# Patient Record
Sex: FEMALE | Race: WHITE | NOT HISPANIC OR LATINO | Employment: OTHER | URBAN - METROPOLITAN AREA
[De-identification: names, ages, dates, MRNs, and addresses within clinical notes are randomized per-mention and may not be internally consistent; named-entity substitution may affect disease eponyms.]

---

## 2017-01-05 ENCOUNTER — TELEPHONE (OUTPATIENT)
Dept: CARDIOLOGY | Facility: MEDICAL CENTER | Age: 62
End: 2017-01-05

## 2017-01-05 NOTE — TELEPHONE ENCOUNTER
Spoke with patient who states she did not call us.  She does not know who called her.  She has started the Kenolog cream and the rash from tegaderm is getting slowly better.  If not better or gets worse she will return to Urgent Care or ER.  She has a follow up with July ROBERTS on 1/26/17.

## 2017-01-05 NOTE — TELEPHONE ENCOUNTER
----- Message from Vidhya Junior sent at 1/5/2017  9:29 AM PST -----  Regarding: patient is calling about her rash  HERRERA/Lynne      Patient said she was in the hospital for a rash she got after her surgery. She said someone from the office called but she doesn't know the name of the person who called her. She thinks the caller was following up to check on how she was doing and condition of her rash. She can be reached at [phone].

## 2017-01-26 ENCOUNTER — OFFICE VISIT (OUTPATIENT)
Dept: CARDIOLOGY | Facility: MEDICAL CENTER | Age: 62
End: 2017-01-26
Payer: COMMERCIAL

## 2017-01-26 VITALS
HEIGHT: 69 IN | WEIGHT: 165.4 LBS | SYSTOLIC BLOOD PRESSURE: 122 MMHG | HEART RATE: 74 BPM | OXYGEN SATURATION: 98 % | BODY MASS INDEX: 24.5 KG/M2 | DIASTOLIC BLOOD PRESSURE: 78 MMHG

## 2017-01-26 DIAGNOSIS — R06.02 SHORTNESS OF BREATH: ICD-10-CM

## 2017-01-26 DIAGNOSIS — I10 ESSENTIAL HYPERTENSION: ICD-10-CM

## 2017-01-26 DIAGNOSIS — E78.5 DYSLIPIDEMIA: ICD-10-CM

## 2017-01-26 DIAGNOSIS — J45.31 MILD PERSISTENT ASTHMA WITH ACUTE EXACERBATION: ICD-10-CM

## 2017-01-26 PROCEDURE — 99214 OFFICE O/P EST MOD 30 MIN: CPT | Performed by: NURSE PRACTITIONER

## 2017-01-26 RX ORDER — LEVOFLOXACIN 500 MG/1
TABLET, FILM COATED ORAL
Refills: 0 | COMMUNITY
Start: 2017-01-19 | End: 2017-01-26

## 2017-01-26 RX ORDER — MONTELUKAST SODIUM 10 MG/1
10 TABLET ORAL DAILY
COMMUNITY
End: 2017-08-16 | Stop reason: SDUPTHER

## 2017-01-26 RX ORDER — TIOTROPIUM BROMIDE 18 UG/1
18 CAPSULE ORAL; RESPIRATORY (INHALATION) DAILY
COMMUNITY
End: 2017-08-16 | Stop reason: SDUPTHER

## 2017-01-26 ASSESSMENT — ENCOUNTER SYMPTOMS
WEAKNESS: 0
ORTHOPNEA: 0
PND: 0
PALPITATIONS: 0
SHORTNESS OF BREATH: 1
MYALGIAS: 0
ABDOMINAL PAIN: 0
COUGH: 0
DIZZINESS: 0
NERVOUS/ANXIOUS: 1
CLAUDICATION: 0

## 2017-01-26 NOTE — PROGRESS NOTES
Subjective:   Shefali Ryan is a 61 y.o. female who presents today with her , Oren, and follow-up after right heart catheterization which was done for shortness of breath. She was told by another cardiologist that she has diastolic heart failure, in other words heart failure with preserved ejection fraction. She is given this diagnosis due to shortness of breath and fluid retention. She came to see Dr. Lyon for a second opinion. He did a right heart catheter to determine if she truly had diastolic dysfunction.    Right heart catheterization was done on December 29, 2016 and it showed normal pressures. Per Dr. Lyon, she does not have diastolic heart failure. Previously, she had coronary calcium scan which showed a score of zero in all arteries. She has echocardiogram done December 13, 2016 which showed an ejection fraction 70% with grade 1 diastolic dysfunction.    She does have a history of asthma and is on Singulair and inhalers. She continues to complain of some shortness of breath with exertion and a full feeling in her lower sternal area. She also gives me history that she has a hiatal hernia and had an esophageal stricture dilated.    She has returned to exercise on an elliptical  per recommendation by Dr. Lyon.  She has shortness of breath with exertion but is starting out slow to get back into shape.    She is very worried about her health and is concerned that she was told she has diastolic heart failure when in fact it appears she does not. She does however realize that she is sensitive to sodium and will get fluid retention. She is limiting sodium in her diet.     Past Medical History   Diagnosis Date   • History of chickenpox    • H/O mumps    • H/O measles    • Hypertension    • High cholesterol    • Pneumonia 2014     Has had frequently, but none since 2014   • Bronchitis    • Asthma      history of bronchial spasms, but better since juicing   • Chicken pox    • Mumps    • Measles     • Shingles    • MRSA (methicillin resistant Staphylococcus aureus) 2007     after abdominal exploratory lap, in [place]   • Diastolic dysfunction      Past Surgical History   Procedure Laterality Date   • Abdominal exploration     • Laminotomy     • Other abdominal surgery  [id number]     several abdominal,before and after anterior lumbar fusion to remove scar tissue   • Gyn surgery       c section   • Other neurological surg  2006     anterior and posterior lumbar fusion L4-S1, [place]   • Other cardiac surgery        3 coronary angiograms   • Cardiac cath  12/29/16     right heart cath normal     Family History   Problem Relation Age of Onset   • Cancer Mother    • Diabetes Mother    • Heart Disease Mother    • Heart Disease Father 76     CABG   • Diabetes Father    • Diabetes Other    • Heart Disease Other    • Heart Attack Brother 50     MI     History   Smoking status   • Never Smoker    Smokeless tobacco   • Never Used     Allergies   Allergen Reactions   • Iodine Rash     Rash to topical   • Chlorhexidine Rash     Per patient    • Apple      Raw apples   • Codeine Vomiting   • Erythromycin Rash   • Septra [Bactrim Ds] Rash   • Statins [Hmg-Coa-R Inhibitors]      Unable to tolerate   • Sulfamethoxazole W-Trimethoprim Rash     Per patient   • Ultram [Tramadol] Vomiting     Vomiting, extreme     Outpatient Encounter Prescriptions as of 1/26/2017   Medication Sig Dispense Refill   • montelukast (SINGULAIR) 10 MG Tab Take 10 mg by mouth every day.     • tiotropium (SPIRIVA HANDIHALER) 18 MCG Cap Inhale 18 mcg by mouth every day.     • cetirizine (ZYRTEC) 10 MG Tab Take 10 mg by mouth every day.     • fluticasone (FLONASE) 50 MCG/ACT nasal spray Spray 1 Spray in nose 2 times a day.     • nitroglycerin (NITROSTAT) 0.4 MG SL Tab Place 0.4 mg under tongue as needed.     • Epinastine HCl 0.05 % Solution INSTILL 1 DROP INTO BOTH EYES TWICE A DAY **INDEFINITELY**  11   • pantoprazole (PROTONIX) 40 MG Tablet  Delayed Response Take 40 mg by mouth every day.     • Coenzyme Q10 (CO Q 10) 100 MG Cap Take 1 Can by mouth every day.     • valsartan (DIOVAN) 160 MG Tab Take 1 Tab by mouth every evening.     • rosuvastatin (CRESTOR) 5 MG Tab Take 5 mg by mouth. Every other day     • Specialty Vitamins Products (RETAINE VISION PO) 1 Drop by Ophthalmic route 2 Times a Day.     • temazepam (RESTORIL) 30 MG capsule TAKE 1 TABLET BY MOUTH AT BEDTIME AS NEEDED FOR SLEEP (Patient taking differently: every bedtime. TAKE 1 TABLET BY MOUTH AT BEDTIME AS NEEDED FOR SLEEP) 30 Cap 2   • Cholecalciferol (VITAMIN D) 2000 UNIT Tab Take 2,000 Units by mouth every day.     • ipratropium-albuterol (COMBIVENT RESPIMAT)  MCG/ACT Aero Soln Inhale 1 Puff by mouth 4 times a day. (Patient taking differently: Inhale 1 Puff by mouth 4 times a day as needed.) 2 Inhaler 2   • budesonide-formoterol (SYMBICORT) 160-4.5 MCG/ACT Aerosol Inhale 1 Puff by mouth 2 Times a Day. 2 Inhaler 2   • aspirin EC (ECOTRIN) 81 MG TBEC Take 81 mg by mouth every day.     • [DISCONTINUED] levofloxacin (LEVAQUIN) 500 MG tablet TK 1 T PO QD  0   • [DISCONTINUED] cephALEXin (KEFLEX) 500 MG Cap Take 1 Cap by mouth 3 times a day. (Patient not taking: Reported on 1/26/2017) 15 Cap 0   • [DISCONTINUED] ipratropium (ATROVENT) 0.06 % Solution INSTILL 2 SPRAYS INTO THE NOSE 4 TIMES A DAY  3   • [DISCONTINUED] triamcinolone acetonide (KENALOG) 0.1 % Cream Apply 1 Film to affected area(s) 2 times a day. (Patient not taking: Reported on 1/26/2017) 1 Tube 0     No facility-administered encounter medications on file as of 1/26/2017.     Review of Systems   Constitutional: Negative for malaise/fatigue.   Respiratory: Positive for shortness of breath (exertion). Negative for cough.    Cardiovascular: Positive for chest pain (fullness lower substernal area occasionally. Not always associated with exertion.) and leg swelling (when eating large amount of sodium.). Negative for palpitations,  "orthopnea, claudication and PND.   Gastrointestinal: Negative for abdominal pain.   Musculoskeletal: Negative for myalgias.   Neurological: Negative for dizziness and weakness.   Psychiatric/Behavioral: The patient is nervous/anxious.         Objective:   /78 mmHg  Pulse 74  Ht 1.753 m (5' 9.02\")  Wt 75.025 kg (165 lb 6.4 oz)  BMI 24.41 kg/m2  SpO2 98%    Physical Exam   Constitutional: She is oriented to person, place, and time. She appears well-developed and well-nourished.   HENT:   Head: Normocephalic.   Eyes: Conjunctivae are normal.   Neck: No JVD present. No thyromegaly present.   Cardiovascular: Normal rate, regular rhythm, S1 normal, S2 normal and normal heart sounds.  Exam reveals no gallop, no S3, no S4 and no friction rub.    No murmur heard.  Pulmonary/Chest: Effort normal and breath sounds normal. No respiratory distress. She has no wheezes. She has no rales.   Abdominal: Soft. Bowel sounds are normal. She exhibits no distension. There is no tenderness.   Musculoskeletal: She exhibits no edema.   Neurological: She is alert and oriented to person, place, and time.   Skin: Skin is warm and dry.   Psychiatric: She has a normal mood and affect.     December 29, 2016: Right heart catheterization:   CONCLUSIONS:  1.  At this time, there is no conclusive evidence indicating significant    diastolic dysfunction in this patient.  2.  Intracardiac pressures are normal at rest and with exercise.  3.  There is no evidence of left to right shunt based on shunt runs.  4.  The patient will be followed in our clinic in the future for further    Evaluation    Results for RICHARD CHAVEZ (MRN [id number]) as of 1/26/2017 10:44   Ref. Range 12/29/2016 10:00   Sodium Latest Ref Range: 135-145 mmol/L 141   Potassium Latest Ref Range: 3.6-5.5 mmol/L 4.1   Chloride Latest Ref Range:  mmol/L 105   Co2 Latest Ref Range: 20-33 mmol/L 26   Anion Gap Latest Ref Range: 0.0-11.9  10.0   Glucose Latest Ref " Range: 65-99 mg/dL 103 (H)   Bun Latest Ref Range: 8-22 mg/dL 21   Creatinine Latest Ref Range: 0.50-1.40 mg/dL 0.65   GFR If  Latest Ref Range: >60 mL/min/1.73 m 2 >60   GFR If Non  Latest Ref Range: >60 mL/min/1.73 m 2 >60   Calcium Latest Ref Range: 8.5-10.5 mg/dL 9.6       Assessment:     1. Shortness of breath     2. Essential hypertension     3. Dyslipidemia     4. Mild persistent asthma with acute exacerbation         Medical Decision Making:  Today's Assessment / Status / Plan:     Shortness of breath: Probably related to her asthma. Does not appear that she has diastolic heart failure per Dr. Lyon's right heart catheter. I expect that when she exerts her so she will feel short of breath and this may be appropriate breathing for exercise.    Hypertension: Her blood pressure is excellent the office today. Continue current medication regimen.    Dyslipidemia: Followed by either primary care or her cardiologist in [place].    Asthma: Probably the cause of her shortness of breath with exertion. Appears well controlled as she has no wheezes today.    It looks as though she does not have heart failure with preserved ejection fraction as indicated by the right heart catheterization. She does however have asthma which would explain her shortness of breath with exertion. She will follow-up with either Dr. Lyon or Dr. Quispe in [place]. She is welcome to return to our practice at any time.    Collaborating Provider: Dr. Stefani Diego.

## 2017-01-26 NOTE — Clinical Note
[facility]   [address] [place] [address]  [place] [address]  Phone: [phone]  Fax: [phone]              Shefali Ryan  1955    Encounter Date: 1/26/2017    DAMIEN Sparrow          PROGRESS NOTE:  Subjective:   Shefali Ryan is a 61 y.o. female who presents today with her , Oren, and follow-up after right heart catheterization which was done for shortness of breath. She was told by another cardiologist that she has diastolic heart failure, in other words heart failure with preserved ejection fraction. She is given this diagnosis due to shortness of breath and fluid retention. She came to see Dr. Lyon for a second opinion. He did a right heart catheter to determine if she truly had diastolic dysfunction.    Right heart catheterization was done on December 29, 2016 and it showed normal pressures. Per Dr. Lyon, she does not have diastolic heart failure. Previously, she had coronary calcium scan which showed a score of zero in all arteries. She has echocardiogram done December 13, 2016 which showed an ejection fraction 70% with grade 1 diastolic dysfunction.    She does have a history of asthma and is on Singulair and inhalers. She continues to complain of some shortness of breath with exertion and a full feeling in her lower sternal area. She also gives me history that she has a hiatal hernia and had an esophageal stricture dilated.    She has returned to exercise on an elliptical  per recommendation by Dr. Lyon.  She has shortness of breath with exertion but is starting out slow to get back into shape.    She is very worried about her health and is concerned that she was told she has diastolic heart failure when in fact it appears she does not. She does however realize that she is sensitive to sodium and will get fluid retention. She is limiting sodium in her diet.     Past Medical History   Diagnosis Date   • History of  chickenpox    • H/O mumps    • H/O measles    • Hypertension    • High cholesterol    • Pneumonia 2014     Has had frequently, but none since 2014   • Bronchitis    • Asthma      history of bronchial spasms, but better since juicing   • Chicken pox    • Mumps    • Measles    • Shingles    • MRSA (methicillin resistant Staphylococcus aureus) 2007     after abdominal exploratory lap, in [place]   • Diastolic dysfunction      Past Surgical History   Procedure Laterality Date   • Abdominal exploration     • Laminotomy     • Other abdominal surgery  [id number]     several abdominal,before and after anterior lumbar fusion to remove scar tissue   • Gyn surgery       c section   • Other neurological surg  2006     anterior and posterior lumbar fusion L4-S1, [place]   • Other cardiac surgery        3 coronary angiograms   • Cardiac cath  12/29/16     right heart cath normal     Family History   Problem Relation Age of Onset   • Cancer Mother    • Diabetes Mother    • Heart Disease Mother    • Heart Disease Father 76     CABG   • Diabetes Father    • Diabetes Other    • Heart Disease Other    • Heart Attack Brother 50     MI     History   Smoking status   • Never Smoker    Smokeless tobacco   • Never Used     Allergies   Allergen Reactions   • Iodine Rash     Rash to topical   • Chlorhexidine Rash     Per patient    • Apple      Raw apples   • Codeine Vomiting   • Erythromycin Rash   • Septra [Bactrim Ds] Rash   • Statins [Hmg-Coa-R Inhibitors]      Unable to tolerate   • Sulfamethoxazole W-Trimethoprim Rash     Per patient   • Ultram [Tramadol] Vomiting     Vomiting, extreme     Outpatient Encounter Prescriptions as of 1/26/2017   Medication Sig Dispense Refill   • montelukast (SINGULAIR) 10 MG Tab Take 10 mg by mouth every day.     • tiotropium (SPIRIVA HANDIHALER) 18 MCG Cap Inhale 18 mcg by mouth every day.     • cetirizine (ZYRTEC) 10 MG Tab Take 10 mg by mouth every day.     • fluticasone (FLONASE) 50 MCG/ACT nasal  spray Spray 1 Spray in nose 2 times a day.     • nitroglycerin (NITROSTAT) 0.4 MG SL Tab Place 0.4 mg under tongue as needed.     • Epinastine HCl 0.05 % Solution INSTILL 1 DROP INTO BOTH EYES TWICE A DAY **INDEFINITELY**  11   • pantoprazole (PROTONIX) 40 MG Tablet Delayed Response Take 40 mg by mouth every day.     • Coenzyme Q10 (CO Q 10) 100 MG Cap Take 1 Can by mouth every day.     • valsartan (DIOVAN) 160 MG Tab Take 1 Tab by mouth every evening.     • rosuvastatin (CRESTOR) 5 MG Tab Take 5 mg by mouth. Every other day     • Specialty Vitamins Products (RETAINE VISION PO) 1 Drop by Ophthalmic route 2 Times a Day.     • temazepam (RESTORIL) 30 MG capsule TAKE 1 TABLET BY MOUTH AT BEDTIME AS NEEDED FOR SLEEP (Patient taking differently: every bedtime. TAKE 1 TABLET BY MOUTH AT BEDTIME AS NEEDED FOR SLEEP) 30 Cap 2   • Cholecalciferol (VITAMIN D) 2000 UNIT Tab Take 2,000 Units by mouth every day.     • ipratropium-albuterol (COMBIVENT RESPIMAT)  MCG/ACT Aero Soln Inhale 1 Puff by mouth 4 times a day. (Patient taking differently: Inhale 1 Puff by mouth 4 times a day as needed.) 2 Inhaler 2   • budesonide-formoterol (SYMBICORT) 160-4.5 MCG/ACT Aerosol Inhale 1 Puff by mouth 2 Times a Day. 2 Inhaler 2   • aspirin EC (ECOTRIN) 81 MG TBEC Take 81 mg by mouth every day.     • [DISCONTINUED] levofloxacin (LEVAQUIN) 500 MG tablet TK 1 T PO QD  0   • [DISCONTINUED] cephALEXin (KEFLEX) 500 MG Cap Take 1 Cap by mouth 3 times a day. (Patient not taking: Reported on 1/26/2017) 15 Cap 0   • [DISCONTINUED] ipratropium (ATROVENT) 0.06 % Solution INSTILL 2 SPRAYS INTO THE NOSE 4 TIMES A DAY  3   • [DISCONTINUED] triamcinolone acetonide (KENALOG) 0.1 % Cream Apply 1 Film to affected area(s) 2 times a day. (Patient not taking: Reported on 1/26/2017) 1 Tube 0     No facility-administered encounter medications on file as of 1/26/2017.     Review of Systems   Constitutional: Negative for malaise/fatigue.   Respiratory: Positive  "for shortness of breath (exertion). Negative for cough.    Cardiovascular: Positive for chest pain (fullness lower substernal area occasionally. Not always associated with exertion.) and leg swelling (when eating large amount of sodium.). Negative for palpitations, orthopnea, claudication and PND.   Gastrointestinal: Negative for abdominal pain.   Musculoskeletal: Negative for myalgias.   Neurological: Negative for dizziness and weakness.   Psychiatric/Behavioral: The patient is nervous/anxious.         Objective:   /78 mmHg  Pulse 74  Ht 1.753 m (5' 9.02\")  Wt 75.025 kg (165 lb 6.4 oz)  BMI 24.41 kg/m2  SpO2 98%    Physical Exam   Constitutional: She is oriented to person, place, and time. She appears well-developed and well-nourished.   HENT:   Head: Normocephalic.   Eyes: Conjunctivae are normal.   Neck: No JVD present. No thyromegaly present.   Cardiovascular: Normal rate, regular rhythm, S1 normal, S2 normal and normal heart sounds.  Exam reveals no gallop, no S3, no S4 and no friction rub.    No murmur heard.  Pulmonary/Chest: Effort normal and breath sounds normal. No respiratory distress. She has no wheezes. She has no rales.   Abdominal: Soft. Bowel sounds are normal. She exhibits no distension. There is no tenderness.   Musculoskeletal: She exhibits no edema.   Neurological: She is alert and oriented to person, place, and time.   Skin: Skin is warm and dry.   Psychiatric: She has a normal mood and affect.     December 29, 2016: Right heart catheterization:   CONCLUSIONS:  1.  At this time, there is no conclusive evidence indicating significant    diastolic dysfunction in this patient.  2.  Intracardiac pressures are normal at rest and with exercise.  3.  There is no evidence of left to right shunt based on shunt runs.  4.  The patient will be followed in our clinic in the future for further    Evaluation    Results for RICHARD CHAVEZ (MRN [id number]) as of 1/26/2017 10:44   Ref. Range " 12/29/2016 10:00   Sodium Latest Ref Range: 135-145 mmol/L 141   Potassium Latest Ref Range: 3.6-5.5 mmol/L 4.1   Chloride Latest Ref Range:  mmol/L 105   Co2 Latest Ref Range: 20-33 mmol/L 26   Anion Gap Latest Ref Range: 0.0-11.9  10.0   Glucose Latest Ref Range: 65-99 mg/dL 103 (H)   Bun Latest Ref Range: 8-22 mg/dL 21   Creatinine Latest Ref Range: 0.50-1.40 mg/dL 0.65   GFR If  Latest Ref Range: >60 mL/min/1.73 m 2 >60   GFR If Non  Latest Ref Range: >60 mL/min/1.73 m 2 >60   Calcium Latest Ref Range: 8.5-10.5 mg/dL 9.6       Assessment:     1. Shortness of breath     2. Essential hypertension     3. Dyslipidemia     4. Mild persistent asthma with acute exacerbation         Medical Decision Making:  Today's Assessment / Status / Plan:     Shortness of breath: Probably related to her asthma. Does not appear that she has diastolic heart failure per Dr. Lyon's right heart catheter. I expect that when she exerts her so she will feel short of breath and this may be appropriate breathing for exercise.    Hypertension: Her blood pressure is excellent the office today. Continue current medication regimen.    Dyslipidemia: Followed by either primary care or her cardiologist in [place].    Asthma: Probably the cause of her shortness of breath with exertion. Appears well controlled as she has no wheezes today.    It looks as though she does not have heart failure with preserved ejection fraction as indicated by the right heart catheterization. She does however have asthma which would explain her shortness of breath with exertion. She will follow-up with either Dr. Lyon or Dr. Quispe in [place]. She is welcome to return to our practice at any time.    Collaborating Provider: Dr. Stefani Diego.        No Recipients

## 2017-01-26 NOTE — MR AVS SNAPSHOT
"Shefali Hannah Linkover   2017 10:20 AM   Office Visit   MRN: [id number]    Department:  [facility]   Dept Phone:  [phone]    Description:  Female : 1955   Provider:  DAMIEN Sparrow           Reason for Visit     Follow-Up Here for a follow up to go over recent angiogram and patient brought in old angiogram from [facility] on 10/13/2016.      Allergies as of 2017     Allergen Noted Reactions    Iodine 2017   Rash    Rash to topical    Chlorhexidine 12/15/2016   Rash    Per patient     Apple 2014       Raw apples    Codeine 2014   Vomiting    Erythromycin 2014   Rash    Septra [Bactrim Ds] 2014   Rash    Statins [Hmg-Coa-R Inhibitors] 2015       Unable to tolerate    Sulfamethoxazole W-Trimethoprim 12/15/2016   Rash    Per patient    Ultram [Tramadol] 2015   Vomiting    Vomiting, extreme      You were diagnosed with     Shortness of breath   [786.05.ICD-9-CM]       Essential hypertension   [4784448]       Dyslipidemia   [555728]       Mild persistent asthma with acute exacerbation   [384902]         Vital Signs     Blood Pressure Pulse Height Weight Body Mass Index Oxygen Saturation    122/78 mmHg 74 1.753 m (5' 9.02\") 75.025 kg (165 lb 6.4 oz) 24.41 kg/m2 98%    Smoking Status                   Never Smoker            Basic Information     Date Of Birth Sex Race Ethnicity Preferred Language    1955 Female White Non- English      Problem List              ICD-10-CM Priority Class Noted - Resolved    Essential hypertension I10   2014 - Present    Insomnia G47.00   2014 - Present    Dyslipidemia E78.5   2014 - Present    Elevated glucose R73.09   2015 - Present    Dyspnea R06.00   2015 - Present    Baker's cyst M71.20   2016 - Present    Environmental allergies Z91.09   2016 - Present    Mild persistent asthma with acute exacerbation J45.31   2016 - Present      Health Maintenance    "    Date Due Completion Dates    IMM DTaP/Tdap/Td Vaccine (1 - Tdap) 9/25/1975 ---    MAMMOGRAM 9/5/2015 9/5/2014 (Done)    Override on 9/5/2014: Done (patient reports through GYN. I request she provide a record. )    IMM ZOSTER VACCINE 9/25/2016 ---    PAP SMEAR 9/5/2017 9/5/2014 (Done), 9/5/2014 (Done)    Override on 9/5/2014: Done    Override on 9/5/2014: Done (patient reports through GYN. I request she provide a record. )    COLONOSCOPY 1/1/2023 1/1/2013 (N/S), 1/1/2013 (Done)    Override on 1/1/2013: (N/S)    Override on 1/1/2013: Done            Current Immunizations     Influenza Vaccine Quad Inj (Pf) 10/18/2016  4:02 PM, 10/7/2014    Pneumococcal polysaccharide vaccine (PPSV-23) 10/18/2016  4:01 PM      Below and/or attached are the medications your provider expects you to take. Review all of your home medications and newly ordered medications with your provider and/or pharmacist. Follow medication instructions as directed by your provider and/or pharmacist. Please keep your medication list with you and share with your provider. Update the information when medications are discontinued, doses are changed, or new medications (including over-the-counter products) are added; and carry medication information at all times in the event of emergency situations     Allergies:  IODINE - Rash     CHLORHEXIDINE - Rash     APPLE - (reactions not documented)     CODEINE - Vomiting     ERYTHROMYCIN - Rash     SEPTRA - Rash     STATINS - (reactions not documented)     SULFAMETHOXAZOLE W-TRIMETHOPRIM - Rash     ULTRAM - Vomiting               Medications  Valid as of: January 26, 2017 - 11:29 AM    Generic Name Brand Name Tablet Size Instructions for use    Aspirin (Tablet Delayed Response) ECOTRIN 81 MG Take 81 mg by mouth every day.        Budesonide-Formoterol Fumarate (Aerosol) SYMBICORT 160-4.5 MCG/ACT Inhale 1 Puff by mouth 2 Times a Day.        Cetirizine HCl (Tab) ZYRTEC 10 MG Take 10 mg by mouth every day.         Cholecalciferol (Tab) vitamin D 2000 UNIT Take 2,000 Units by mouth every day.        Coenzyme Q10 (Cap) Co Q 10 100 MG Take 1 Can by mouth every day.        Epinastine HCl (Solution) Epinastine HCl 0.05 % INSTILL 1 DROP INTO BOTH EYES TWICE A DAY **INDEFINITELY**        Fluticasone Propionate (Suspension) FLONASE 50 MCG/ACT Spray 1 Spray in nose 2 times a day.        Ipratropium-Albuterol (Aero Soln) COMBIVENT RESPIMAT  MCG/ACT Inhale 1 Puff by mouth 4 times a day.        Montelukast Sodium (Tab) SINGULAIR 10 MG Take 10 mg by mouth every day.        Nitroglycerin (SL Tab) NITROSTAT 0.4 MG Place 0.4 mg under tongue as needed.        Pantoprazole Sodium (Tablet Delayed Response) PROTONIX 40 MG Take 40 mg by mouth every day.        Rosuvastatin Calcium (Tab) CRESTOR 5 MG Take 5 mg by mouth. Every other day        Specialty Vitamins Products   1 Drop by Ophthalmic route 2 Times a Day.        Temazepam (Cap) RESTORIL 30 MG TAKE 1 TABLET BY MOUTH AT BEDTIME AS NEEDED FOR SLEEP        Tiotropium Bromide Monohydrate (Cap) SPIRIVA 18 MCG Inhale 18 mcg by mouth every day.        Valsartan (Tab) DIOVAN 160 MG Take 1 Tab by mouth every evening.        .                 Medicines prescribed today were sent to:     [facility] [address] [place] [address] - [address] [place], [place] [address] [facility] [address]    Phone: [phone] Fax: [phone]    Open 24 Hours?: No      Medication refill instructions:       If your prescription bottle indicates you have medication refills left, it is not necessary to call your provider’s office. Please contact your pharmacy and they will refill your medication.    If your prescription bottle indicates you do not have any refills left, you may request refills at any time through one of the following ways: The online [employer] system (except Urgent Care), by calling your provider’s office, or by asking your pharmacy to contact your provider’s office with a refill request. Medication  refills are processed only during regular business hours and may not be available until the next business day. Your provider may request additional information or to have a follow-up visit with you prior to refilling your medication.   *Please Note: Medication refills are assigned a new Rx number when refilled electronically. Your pharmacy may indicate that no refills were authorized even though a new prescription for the same medication is available at the pharmacy. Please request the medicine by name with the pharmacy before contacting your provider for a refill.           [employer] Access Code: Activation code not generated  Current [employer] Status: Active

## 2017-08-10 PROBLEM — R79.89 LOW VITAMIN D LEVEL: Status: ACTIVE | Noted: 2017-08-10

## 2017-10-05 ENCOUNTER — APPOINTMENT (RX ONLY)
Dept: URBAN - METROPOLITAN AREA CLINIC 31 | Facility: CLINIC | Age: 62
Setting detail: DERMATOLOGY
End: 2017-10-05

## 2017-10-05 DIAGNOSIS — L82.1 OTHER SEBORRHEIC KERATOSIS: ICD-10-CM

## 2017-10-05 DIAGNOSIS — L57.8 OTHER SKIN CHANGES DUE TO CHRONIC EXPOSURE TO NONIONIZING RADIATION: ICD-10-CM

## 2017-10-05 DIAGNOSIS — D18.0 HEMANGIOMA: ICD-10-CM

## 2017-10-05 DIAGNOSIS — D22 MELANOCYTIC NEVI: ICD-10-CM

## 2017-10-05 DIAGNOSIS — L57.0 ACTINIC KERATOSIS: ICD-10-CM

## 2017-10-05 PROBLEM — D18.01 HEMANGIOMA OF SKIN AND SUBCUTANEOUS TISSUE: Status: ACTIVE | Noted: 2017-10-05

## 2017-10-05 PROBLEM — D22.5 MELANOCYTIC NEVI OF TRUNK: Status: ACTIVE | Noted: 2017-10-05

## 2017-10-05 PROBLEM — I10 ESSENTIAL (PRIMARY) HYPERTENSION: Status: ACTIVE | Noted: 2017-10-05

## 2017-10-05 PROCEDURE — ? LIQUID NITROGEN

## 2017-10-05 PROCEDURE — 99214 OFFICE O/P EST MOD 30 MIN: CPT | Mod: 25

## 2017-10-05 PROCEDURE — ? COUNSELING

## 2017-10-05 PROCEDURE — 17003 DESTRUCT PREMALG LES 2-14: CPT

## 2017-10-05 PROCEDURE — 17000 DESTRUCT PREMALG LESION: CPT

## 2017-10-05 ASSESSMENT — LOCATION SIMPLE DESCRIPTION DERM
LOCATION SIMPLE: RIGHT HAND
LOCATION SIMPLE: NOSE
LOCATION SIMPLE: LEFT UPPER BACK
LOCATION SIMPLE: RIGHT CHEEK
LOCATION SIMPLE: RIGHT FOREARM
LOCATION SIMPLE: LEFT ANTERIOR NECK
LOCATION SIMPLE: CHEST
LOCATION SIMPLE: LEFT HAND
LOCATION SIMPLE: RIGHT UPPER BACK
LOCATION SIMPLE: LEFT FOREARM

## 2017-10-05 ASSESSMENT — LOCATION DETAILED DESCRIPTION DERM
LOCATION DETAILED: RIGHT MID-UPPER BACK
LOCATION DETAILED: LEFT MEDIAL INFERIOR CHEST
LOCATION DETAILED: LEFT PROXIMAL DORSAL FOREARM
LOCATION DETAILED: RIGHT DISTAL DORSAL FOREARM
LOCATION DETAILED: RIGHT RADIAL DORSAL HAND
LOCATION DETAILED: RIGHT INFERIOR CENTRAL MALAR CHEEK
LOCATION DETAILED: LEFT ULNAR DORSAL HAND
LOCATION DETAILED: LEFT MEDIAL UPPER BACK
LOCATION DETAILED: NASAL ROOT
LOCATION DETAILED: LEFT SUPERIOR ANTERIOR NECK
LOCATION DETAILED: RIGHT PROXIMAL DORSAL FOREARM

## 2017-10-05 ASSESSMENT — LOCATION ZONE DERM
LOCATION ZONE: HAND
LOCATION ZONE: FACE
LOCATION ZONE: NOSE
LOCATION ZONE: ARM
LOCATION ZONE: TRUNK
LOCATION ZONE: NECK

## 2017-10-05 NOTE — PROCEDURE: LIQUID NITROGEN
Post-Care Instructions: I reviewed with the patient in detail post-care instructions. Patient is to wear sunprotection, and avoid picking at any of the treated lesions. Pt may apply Vaseline to crusted or scabbing areas.
Number Of Freeze-Thaw Cycles: 1 freeze-thaw cycle
Consent: The patient's consent was obtained including but not limited to risks of crusting, scabbing, blistering, scarring, darker or lighter pigmentary change, recurrence, incomplete removal and infection.
Duration Of Freeze Thaw-Cycle (Seconds): 15
Render Post-Care Instructions In Note?: no
Detail Level: Detailed

## 2017-10-05 NOTE — PROCEDURE: REASSURANCE
Additional Note: Patient is reassured regarding the benign nature of this/these lesion(s).  Patient is encouraged to return for evaluation if lesion(s) grow, change, or becomes symptomatic.\\n
Detail Level: Zone
Include Location In Plan?: No

## 2017-11-09 PROBLEM — S06.0X0A CONCUSSION WITH NO LOSS OF CONSCIOUSNESS: Status: ACTIVE | Noted: 2017-11-09

## 2017-11-10 PROBLEM — M54.12 CERVICAL RADICULOPATHY: Status: ACTIVE | Noted: 2017-11-10

## 2018-03-02 ENCOUNTER — APPOINTMENT (RX ONLY)
Dept: URBAN - METROPOLITAN AREA CLINIC 31 | Facility: CLINIC | Age: 63
Setting detail: DERMATOLOGY
End: 2018-03-02

## 2018-03-02 DIAGNOSIS — D22 MELANOCYTIC NEVI: ICD-10-CM

## 2018-03-02 DIAGNOSIS — D18.0 HEMANGIOMA: ICD-10-CM

## 2018-03-02 DIAGNOSIS — L57.0 ACTINIC KERATOSIS: ICD-10-CM

## 2018-03-02 DIAGNOSIS — L57.8 OTHER SKIN CHANGES DUE TO CHRONIC EXPOSURE TO NONIONIZING RADIATION: ICD-10-CM

## 2018-03-02 DIAGNOSIS — L25.9 UNSPECIFIED CONTACT DERMATITIS, UNSPECIFIED CAUSE: ICD-10-CM

## 2018-03-02 DIAGNOSIS — L73.8 OTHER SPECIFIED FOLLICULAR DISORDERS: ICD-10-CM

## 2018-03-02 PROBLEM — D22.39 MELANOCYTIC NEVI OF OTHER PARTS OF FACE: Status: ACTIVE | Noted: 2018-03-02

## 2018-03-02 PROBLEM — D18.01 HEMANGIOMA OF SKIN AND SUBCUTANEOUS TISSUE: Status: ACTIVE | Noted: 2018-03-02

## 2018-03-02 PROBLEM — D22.5 MELANOCYTIC NEVI OF TRUNK: Status: ACTIVE | Noted: 2018-03-02

## 2018-03-02 PROBLEM — D22.61 MELANOCYTIC NEVI OF RIGHT UPPER LIMB, INCLUDING SHOULDER: Status: ACTIVE | Noted: 2018-03-02

## 2018-03-02 PROBLEM — D22.62 MELANOCYTIC NEVI OF LEFT UPPER LIMB, INCLUDING SHOULDER: Status: ACTIVE | Noted: 2018-03-02

## 2018-03-02 PROCEDURE — ? LIQUID NITROGEN

## 2018-03-02 PROCEDURE — ? COUNSELING

## 2018-03-02 PROCEDURE — ? PRESCRIPTION

## 2018-03-02 PROCEDURE — 99213 OFFICE O/P EST LOW 20 MIN: CPT | Mod: 25

## 2018-03-02 PROCEDURE — 17000 DESTRUCT PREMALG LESION: CPT

## 2018-03-02 RX ORDER — HYDROCORTISONE 25 MG/G
CREAM TOPICAL
Qty: 30 | Refills: 3 | Status: ERX

## 2018-03-02 ASSESSMENT — LOCATION SIMPLE DESCRIPTION DERM
LOCATION SIMPLE: INFERIOR FOREHEAD
LOCATION SIMPLE: LEFT ELBOW
LOCATION SIMPLE: RIGHT CHEEK
LOCATION SIMPLE: RIGHT UPPER ARM
LOCATION SIMPLE: CHEST
LOCATION SIMPLE: LEFT FOREARM
LOCATION SIMPLE: NOSE
LOCATION SIMPLE: RIGHT FOREARM
LOCATION SIMPLE: GLUTEAL CLEFT
LOCATION SIMPLE: LEFT HAND
LOCATION SIMPLE: LEFT CHEEK
LOCATION SIMPLE: LEFT UPPER BACK
LOCATION SIMPLE: LEFT ANTERIOR NECK
LOCATION SIMPLE: RIGHT HAND

## 2018-03-02 ASSESSMENT — LOCATION DETAILED DESCRIPTION DERM
LOCATION DETAILED: INFERIOR MID FOREHEAD
LOCATION DETAILED: LEFT SUPERIOR ANTERIOR NECK
LOCATION DETAILED: RIGHT DORSAL MIDDLE METACARPOPHALANGEAL JOINT
LOCATION DETAILED: RIGHT PROXIMAL RADIAL DORSAL FOREARM
LOCATION DETAILED: LEFT ULNAR DORSAL HAND
LOCATION DETAILED: RIGHT MEDIAL INFERIOR CHEST
LOCATION DETAILED: RIGHT ANTERIOR DISTAL UPPER ARM
LOCATION DETAILED: LEFT ELBOW
LOCATION DETAILED: LEFT CENTRAL MALAR CHEEK
LOCATION DETAILED: NASAL DORSUM
LOCATION DETAILED: LEFT SUPERIOR UPPER BACK
LOCATION DETAILED: RIGHT INFERIOR CENTRAL MALAR CHEEK
LOCATION DETAILED: LEFT VENTRAL PROXIMAL FOREARM
LOCATION DETAILED: GLUTEAL CLEFT
LOCATION DETAILED: RIGHT MEDIAL SUPERIOR CHEST

## 2018-03-02 ASSESSMENT — LOCATION ZONE DERM
LOCATION ZONE: TRUNK
LOCATION ZONE: ARM
LOCATION ZONE: FACE
LOCATION ZONE: NOSE
LOCATION ZONE: HAND
LOCATION ZONE: NECK

## 2018-03-02 NOTE — PROCEDURE: LIQUID NITROGEN
Duration Of Freeze Thaw-Cycle (Seconds): 15
Consent: The patient's consent was obtained including but not limited to risks of crusting, scabbing, blistering, scarring, darker or lighter pigmentary change, recurrence, incomplete removal and infection.
Render Post-Care Instructions In Note?: no
Post-Care Instructions: I reviewed with the patient in detail post-care instructions. Patient is to wear sunprotection, and avoid picking at any of the treated lesions. Pt may apply Vaseline to crusted or scabbing areas.
Number Of Freeze-Thaw Cycles: 1 freeze-thaw cycle
Detail Level: Detailed

## 2018-05-09 PROBLEM — Z98.1 STATUS POST CERVICAL SPINAL FUSION: Status: ACTIVE | Noted: 2018-05-09

## 2018-12-12 PROBLEM — J45.30 MILD PERSISTENT ASTHMA: Status: ACTIVE | Noted: 2018-12-12

## 2018-12-12 PROBLEM — S06.0X0A CONCUSSION WITH NO LOSS OF CONSCIOUSNESS: Status: RESOLVED | Noted: 2017-11-09 | Resolved: 2018-12-12

## 2019-01-23 ENCOUNTER — APPOINTMENT (RX ONLY)
Dept: URBAN - METROPOLITAN AREA CLINIC 31 | Facility: CLINIC | Age: 64
Setting detail: DERMATOLOGY
End: 2019-01-23

## 2019-01-23 DIAGNOSIS — L57.0 ACTINIC KERATOSIS: ICD-10-CM

## 2019-01-23 DIAGNOSIS — L82.1 OTHER SEBORRHEIC KERATOSIS: ICD-10-CM

## 2019-01-23 DIAGNOSIS — D22 MELANOCYTIC NEVI: ICD-10-CM

## 2019-01-23 DIAGNOSIS — L57.8 OTHER SKIN CHANGES DUE TO CHRONIC EXPOSURE TO NONIONIZING RADIATION: ICD-10-CM

## 2019-01-23 DIAGNOSIS — D18.0 HEMANGIOMA: ICD-10-CM

## 2019-01-23 DIAGNOSIS — L82.0 INFLAMED SEBORRHEIC KERATOSIS: ICD-10-CM

## 2019-01-23 PROBLEM — D22.4 MELANOCYTIC NEVI OF SCALP AND NECK: Status: ACTIVE | Noted: 2019-01-23

## 2019-01-23 PROBLEM — D48.5 NEOPLASM OF UNCERTAIN BEHAVIOR OF SKIN: Status: ACTIVE | Noted: 2019-01-23

## 2019-01-23 PROBLEM — D22.62 MELANOCYTIC NEVI OF LEFT UPPER LIMB, INCLUDING SHOULDER: Status: ACTIVE | Noted: 2019-01-23

## 2019-01-23 PROBLEM — D18.01 HEMANGIOMA OF SKIN AND SUBCUTANEOUS TISSUE: Status: ACTIVE | Noted: 2019-01-23

## 2019-01-23 PROBLEM — D22.71 MELANOCYTIC NEVI OF RIGHT LOWER LIMB, INCLUDING HIP: Status: ACTIVE | Noted: 2019-01-23

## 2019-01-23 PROBLEM — D22.72 MELANOCYTIC NEVI OF LEFT LOWER LIMB, INCLUDING HIP: Status: ACTIVE | Noted: 2019-01-23

## 2019-01-23 PROBLEM — D22.61 MELANOCYTIC NEVI OF RIGHT UPPER LIMB, INCLUDING SHOULDER: Status: ACTIVE | Noted: 2019-01-23

## 2019-01-23 PROBLEM — D22.5 MELANOCYTIC NEVI OF TRUNK: Status: ACTIVE | Noted: 2019-01-23

## 2019-01-23 PROCEDURE — ? LIQUID NITROGEN

## 2019-01-23 PROCEDURE — 99214 OFFICE O/P EST MOD 30 MIN: CPT | Mod: 25

## 2019-01-23 PROCEDURE — ? COUNSELING

## 2019-01-23 PROCEDURE — ? BIOPSY BY SHAVE METHOD

## 2019-01-23 PROCEDURE — ? BENIGN DESTRUCTION

## 2019-01-23 PROCEDURE — 17000 DESTRUCT PREMALG LESION: CPT | Mod: 59

## 2019-01-23 PROCEDURE — 11102 TANGNTL BX SKIN SINGLE LES: CPT | Mod: 59

## 2019-01-23 PROCEDURE — 17110 DESTRUCTION B9 LES UP TO 14: CPT

## 2019-01-23 ASSESSMENT — LOCATION DETAILED DESCRIPTION DERM
LOCATION DETAILED: NASAL DORSUM
LOCATION DETAILED: EPIGASTRIC SKIN
LOCATION DETAILED: RIGHT SUPERIOR UPPER BACK
LOCATION DETAILED: RIGHT SUPERIOR MEDIAL MALAR CHEEK
LOCATION DETAILED: LEFT SUPERIOR CENTRAL MALAR CHEEK
LOCATION DETAILED: RIGHT MID-UPPER BACK
LOCATION DETAILED: LEFT SUPERIOR MEDIAL MIDBACK
LOCATION DETAILED: RIGHT DISTAL CALF
LOCATION DETAILED: RIGHT PROXIMAL DORSAL FOREARM
LOCATION DETAILED: RIGHT ANTERIOR PROXIMAL THIGH
LOCATION DETAILED: LEFT MEDIAL UPPER BACK
LOCATION DETAILED: RIGHT INFERIOR LATERAL MIDBACK
LOCATION DETAILED: RIGHT DISTAL PRETIBIAL REGION
LOCATION DETAILED: RIGHT MEDIAL INFERIOR CHEST
LOCATION DETAILED: RIGHT PROXIMAL PRETIBIAL REGION
LOCATION DETAILED: LEFT PROXIMAL DORSAL FOREARM
LOCATION DETAILED: LEFT DISTAL PRETIBIAL REGION
LOCATION DETAILED: LEFT DISTAL CALF
LOCATION DETAILED: RIGHT DISTAL POSTERIOR UPPER ARM
LOCATION DETAILED: RIGHT INFERIOR CENTRAL MALAR CHEEK
LOCATION DETAILED: LEFT DISTAL POSTERIOR UPPER ARM
LOCATION DETAILED: LEFT PROXIMAL PRETIBIAL REGION
LOCATION DETAILED: LEFT INFERIOR UPPER BACK
LOCATION DETAILED: LEFT PROXIMAL POSTERIOR UPPER ARM
LOCATION DETAILED: RIGHT RADIAL DORSAL HAND
LOCATION DETAILED: LEFT MID-UPPER BACK
LOCATION DETAILED: RIGHT INFERIOR POSTERIOR NECK
LOCATION DETAILED: LEFT ULNAR DORSAL HAND
LOCATION DETAILED: RIGHT PROXIMAL LATERAL POSTERIOR UPPER ARM
LOCATION DETAILED: LEFT SUPERIOR UPPER BACK
LOCATION DETAILED: RIGHT RIB CAGE
LOCATION DETAILED: LEFT LATERAL ABDOMEN
LOCATION DETAILED: LEFT ANTERIOR PROXIMAL THIGH
LOCATION DETAILED: LEFT SUPERIOR ANTERIOR NECK

## 2019-01-23 ASSESSMENT — LOCATION SIMPLE DESCRIPTION DERM
LOCATION SIMPLE: LEFT UPPER BACK
LOCATION SIMPLE: RIGHT FOREARM
LOCATION SIMPLE: LEFT POSTERIOR UPPER ARM
LOCATION SIMPLE: RIGHT CHEEK
LOCATION SIMPLE: RIGHT CALF
LOCATION SIMPLE: LEFT ANTERIOR NECK
LOCATION SIMPLE: NOSE
LOCATION SIMPLE: CHEST
LOCATION SIMPLE: LEFT LOWER BACK
LOCATION SIMPLE: ABDOMEN
LOCATION SIMPLE: RIGHT HAND
LOCATION SIMPLE: RIGHT UPPER BACK
LOCATION SIMPLE: LEFT PRETIBIAL REGION
LOCATION SIMPLE: RIGHT PRETIBIAL REGION
LOCATION SIMPLE: LEFT CHEEK
LOCATION SIMPLE: LEFT HAND
LOCATION SIMPLE: LEFT THIGH
LOCATION SIMPLE: POSTERIOR NECK
LOCATION SIMPLE: LEFT FOREARM
LOCATION SIMPLE: RIGHT LOWER BACK
LOCATION SIMPLE: RIGHT POSTERIOR UPPER ARM
LOCATION SIMPLE: RIGHT THIGH
LOCATION SIMPLE: LEFT CALF

## 2019-01-23 ASSESSMENT — LOCATION ZONE DERM
LOCATION ZONE: FACE
LOCATION ZONE: ARM
LOCATION ZONE: LEG
LOCATION ZONE: NECK
LOCATION ZONE: HAND
LOCATION ZONE: TRUNK
LOCATION ZONE: NOSE

## 2019-01-23 NOTE — PROCEDURE: LIQUID NITROGEN
Duration Of Freeze Thaw-Cycle (Seconds): 15
Post-Care Instructions: I reviewed with the patient in detail post-care instructions. Patient is to wear sunprotection, and avoid picking at any of the treated lesions. Pt may apply Vaseline to crusted or scabbing areas.
Number Of Freeze-Thaw Cycles: 1 freeze-thaw cycle
Render Post-Care Instructions In Note?: no
Detail Level: Detailed
Consent: The patient's consent was obtained including but not limited to risks of crusting, scabbing, blistering, scarring, darker or lighter pigmentary change, recurrence, incomplete removal and infection.

## 2019-01-23 NOTE — PROCEDURE: BIOPSY BY SHAVE METHOD
Wound Care: Petrolatum
Render Post-Care Instructions In Note?: no
Additional Anesthesia Volume In Cc (Will Not Render If 0): 0
Detail Level: Detailed
Lab Facility: 
Electrodesiccation Text: The wound bed was treated with electrodesiccation after the biopsy was performed.
Billing Type: Third-Party Bill
Size Of Lesion In Cm: 0.5
Type Of Destruction Used: Curettage
Depth Of Biopsy: dermis
Electrodesiccation And Curettage Text: The wound bed was treated with electrodesiccation and curettage after the biopsy was performed.
Dressing: bandage
Post-Care Instructions: I reviewed with the patient in detail post-care instructions. Patient is to keep the biopsy site bandaged overnight, and then wash once daily with soap and water and then apply a thin layer of petrolatum once daily until healed.
Biopsy Type: H and E
Notification Instructions: Patient will be notified of biopsy results. However, patient instructed to call the office if not contacted within 2 weeks.
Curettage Text: The wound bed was treated with curettage after the biopsy was performed.
Hemostasis: Aluminum Chloride and Electrocautery
Silver Nitrate Text: The wound bed was treated with silver nitrate after the biopsy was performed.
Lab: [id number]
Was A Bandage Applied: Yes
Anesthesia Type: 1% lidocaine with epinephrine
Consent: Written consent was obtained and risks were reviewed including but not limited to scarring, infection, bleeding, scabbing, incomplete removal, nerve damage and allergy to anesthesia.
Cryotherapy Text: The wound bed was treated with cryotherapy after the biopsy was performed.

## 2019-01-23 NOTE — PROCEDURE: BENIGN DESTRUCTION
Medical Necessity Clause: This procedure was medically necessary because the lesions that were treated were:
Consent: The patient's consent was obtained including but not limited to risks of crusting, scabbing, blistering, scarring, darker or lighter pigmentary change, recurrence, incomplete removal and infection.
Include Z78.9 (Other Specified Conditions Influencing Health Status) As An Associated Diagnosis?: No
Post-Care Instructions: I reviewed with the patient in detail post-care instructions. Patient is to wear sunprotection, and avoid picking at any of the treated lesions. Pt may apply Vaseline to crusted or scabbing areas.
Anesthesia Volume In Cc: 0.5
Treatment Number (Will Not Render If 0): 0
Medical Necessity Information: It is in your best interest to select a reason for this procedure from the list below. All of these items fulfill various CMS LCD requirements except the new and changing color options.
Detail Level: Detailed
Render Post-Care Instructions In Note?: yes

## 2019-06-17 PROBLEM — E78.2 MIXED HYPERLIPIDEMIA: Status: ACTIVE | Noted: 2019-06-17

## 2019-08-29 ENCOUNTER — APPOINTMENT (RX ONLY)
Dept: URBAN - METROPOLITAN AREA CLINIC 31 | Facility: CLINIC | Age: 64
Setting detail: DERMATOLOGY
End: 2019-08-29

## 2019-08-29 DIAGNOSIS — L57.8 OTHER SKIN CHANGES DUE TO CHRONIC EXPOSURE TO NONIONIZING RADIATION: ICD-10-CM

## 2019-08-29 DIAGNOSIS — L82.0 INFLAMED SEBORRHEIC KERATOSIS: ICD-10-CM

## 2019-08-29 DIAGNOSIS — D22 MELANOCYTIC NEVI: ICD-10-CM

## 2019-08-29 DIAGNOSIS — L82.1 OTHER SEBORRHEIC KERATOSIS: ICD-10-CM

## 2019-08-29 PROBLEM — D22.5 MELANOCYTIC NEVI OF TRUNK: Status: ACTIVE | Noted: 2019-08-29

## 2019-08-29 PROBLEM — D22.71 MELANOCYTIC NEVI OF RIGHT LOWER LIMB, INCLUDING HIP: Status: ACTIVE | Noted: 2019-08-29

## 2019-08-29 PROBLEM — D22.72 MELANOCYTIC NEVI OF LEFT LOWER LIMB, INCLUDING HIP: Status: ACTIVE | Noted: 2019-08-29

## 2019-08-29 PROBLEM — D22.61 MELANOCYTIC NEVI OF RIGHT UPPER LIMB, INCLUDING SHOULDER: Status: ACTIVE | Noted: 2019-08-29

## 2019-08-29 PROBLEM — D22.62 MELANOCYTIC NEVI OF LEFT UPPER LIMB, INCLUDING SHOULDER: Status: ACTIVE | Noted: 2019-08-29

## 2019-08-29 PROCEDURE — 99214 OFFICE O/P EST MOD 30 MIN: CPT | Mod: 25

## 2019-08-29 PROCEDURE — 17110 DESTRUCTION B9 LES UP TO 14: CPT

## 2019-08-29 PROCEDURE — ? COUNSELING

## 2019-08-29 PROCEDURE — ? BENIGN DESTRUCTION

## 2019-08-29 ASSESSMENT — LOCATION SIMPLE DESCRIPTION DERM
LOCATION SIMPLE: CHEST
LOCATION SIMPLE: UPPER BACK
LOCATION SIMPLE: RIGHT CHEEK
LOCATION SIMPLE: LEFT ANTERIOR NECK
LOCATION SIMPLE: RIGHT POSTERIOR UPPER ARM
LOCATION SIMPLE: RIGHT LOWER BACK
LOCATION SIMPLE: RIGHT HAND
LOCATION SIMPLE: ABDOMEN
LOCATION SIMPLE: LEFT UPPER ARM
LOCATION SIMPLE: LEFT SHOULDER
LOCATION SIMPLE: RIGHT THIGH
LOCATION SIMPLE: LEFT HAND
LOCATION SIMPLE: RIGHT UPPER BACK
LOCATION SIMPLE: RIGHT BACK
LOCATION SIMPLE: RIGHT FOREARM
LOCATION SIMPLE: LEFT FOREARM
LOCATION SIMPLE: RIGHT SHOULDER
LOCATION SIMPLE: LEFT LOWER BACK
LOCATION SIMPLE: RIGHT ELBOW
LOCATION SIMPLE: RIGHT UPPER ARM
LOCATION SIMPLE: LEFT POSTERIOR UPPER ARM
LOCATION SIMPLE: LEFT UPPER BACK
LOCATION SIMPLE: LEFT PRETIBIAL REGION

## 2019-08-29 ASSESSMENT — LOCATION DETAILED DESCRIPTION DERM
LOCATION DETAILED: PERIUMBILICAL SKIN
LOCATION DETAILED: LEFT INFERIOR UPPER BACK
LOCATION DETAILED: LEFT POSTERIOR SHOULDER
LOCATION DETAILED: RIGHT DISTAL POSTERIOR UPPER ARM
LOCATION DETAILED: RIGHT LATERAL ABDOMEN
LOCATION DETAILED: RIGHT RADIAL DORSAL HAND
LOCATION DETAILED: LEFT PROXIMAL POSTERIOR UPPER ARM
LOCATION DETAILED: RIGHT MEDIAL INFERIOR CHEST
LOCATION DETAILED: LEFT INFERIOR MEDIAL MIDBACK
LOCATION DETAILED: EPIGASTRIC SKIN
LOCATION DETAILED: LEFT SUPERIOR MEDIAL UPPER BACK
LOCATION DETAILED: LEFT ANTECUBITAL SKIN
LOCATION DETAILED: RIGHT POSTERIOR SHOULDER
LOCATION DETAILED: RIGHT SUPERIOR UPPER BACK
LOCATION DETAILED: LEFT PROXIMAL DORSAL FOREARM
LOCATION DETAILED: RIGHT SUPERIOR LATERAL UPPER BACK
LOCATION DETAILED: INFERIOR THORACIC SPINE
LOCATION DETAILED: SUBXIPHOID
LOCATION DETAILED: RIGHT PROXIMAL DORSAL FOREARM
LOCATION DETAILED: LEFT PROXIMAL PRETIBIAL REGION
LOCATION DETAILED: LEFT SUPERIOR MEDIAL MIDBACK
LOCATION DETAILED: RIGHT INFERIOR MEDIAL MIDBACK
LOCATION DETAILED: LEFT SUPERIOR ANTERIOR NECK
LOCATION DETAILED: RIGHT INFERIOR CENTRAL MALAR CHEEK
LOCATION DETAILED: RIGHT SUPERIOR MEDIAL UPPER BACK
LOCATION DETAILED: RIGHT ELBOW
LOCATION DETAILED: LEFT ANTERIOR LATERAL PROXIMAL UPPER ARM
LOCATION DETAILED: LEFT LATERAL SUPERIOR CHEST
LOCATION DETAILED: RIGHT ANTERIOR SHOULDER
LOCATION DETAILED: LEFT MEDIAL SUPERIOR CHEST
LOCATION DETAILED: RIGHT LATERAL SUPERIOR CHEST
LOCATION DETAILED: RIGHT ANTERIOR DISTAL THIGH
LOCATION DETAILED: LEFT LATERAL ABDOMEN
LOCATION DETAILED: RIGHT RIB CAGE
LOCATION DETAILED: MIDDLE STERNUM
LOCATION DETAILED: LEFT ANTERIOR DISTAL UPPER ARM
LOCATION DETAILED: LEFT DISTAL POSTERIOR UPPER ARM
LOCATION DETAILED: RIGHT ANTERIOR DISTAL UPPER ARM
LOCATION DETAILED: LEFT SUPERIOR UPPER BACK
LOCATION DETAILED: RIGHT ANTECUBITAL SKIN
LOCATION DETAILED: LEFT ULNAR DORSAL HAND
LOCATION DETAILED: LEFT ANTERIOR SHOULDER
LOCATION DETAILED: RIGHT MID-UPPER BACK

## 2019-08-29 ASSESSMENT — LOCATION ZONE DERM
LOCATION ZONE: LEG
LOCATION ZONE: HAND
LOCATION ZONE: FACE
LOCATION ZONE: TRUNK
LOCATION ZONE: NECK
LOCATION ZONE: ARM

## 2019-08-29 NOTE — PROCEDURE: BENIGN DESTRUCTION
Medical Necessity Clause: This procedure was medically necessary because the lesions that were treated were:
Render Post-Care Instructions In Note?: yes
Post-Care Instructions: I reviewed with the patient in detail post-care instructions. Patient is to wear sunprotection, and avoid picking at any of the treated lesions. Pt may apply Vaseline to crusted or scabbing areas.
Treatment Number (Will Not Render If 0): 0
Anesthesia Volume In Cc: 0.5
Add 52 Modifier (Optional): no
Medical Necessity Information: It is in your best interest to select a reason for this procedure from the list below. All of these items fulfill various CMS LCD requirements except the new and changing color options.
Detail Level: Detailed
Consent: The patient's consent was obtained including but not limited to risks of crusting, scabbing, blistering, scarring, darker or lighter pigmentary change, recurrence, incomplete removal and infection.

## 2020-10-07 ENCOUNTER — APPOINTMENT (RX ONLY)
Dept: URBAN - METROPOLITAN AREA CLINIC 31 | Facility: CLINIC | Age: 65
Setting detail: DERMATOLOGY
End: 2020-10-07

## 2020-10-07 DIAGNOSIS — D17 BENIGN LIPOMATOUS NEOPLASM: ICD-10-CM

## 2020-10-07 DIAGNOSIS — L82.1 OTHER SEBORRHEIC KERATOSIS: ICD-10-CM

## 2020-10-07 DIAGNOSIS — D18.0 HEMANGIOMA: ICD-10-CM

## 2020-10-07 DIAGNOSIS — L57.0 ACTINIC KERATOSIS: ICD-10-CM

## 2020-10-07 DIAGNOSIS — D22 MELANOCYTIC NEVI: ICD-10-CM

## 2020-10-07 DIAGNOSIS — L81.4 OTHER MELANIN HYPERPIGMENTATION: ICD-10-CM

## 2020-10-07 PROBLEM — D18.01 HEMANGIOMA OF SKIN AND SUBCUTANEOUS TISSUE: Status: ACTIVE | Noted: 2020-10-07

## 2020-10-07 PROBLEM — D17.23 BENIGN LIPOMATOUS NEOPLASM OF SKIN AND SUBCUTANEOUS TISSUE OF RIGHT LEG: Status: ACTIVE | Noted: 2020-10-07

## 2020-10-07 PROBLEM — D22.5 MELANOCYTIC NEVI OF TRUNK: Status: ACTIVE | Noted: 2020-10-07

## 2020-10-07 PROCEDURE — ? LIQUID NITROGEN

## 2020-10-07 PROCEDURE — ? COUNSELING

## 2020-10-07 PROCEDURE — 17000 DESTRUCT PREMALG LESION: CPT

## 2020-10-07 PROCEDURE — 17003 DESTRUCT PREMALG LES 2-14: CPT

## 2020-10-07 PROCEDURE — 99214 OFFICE O/P EST MOD 30 MIN: CPT | Mod: 25

## 2020-10-07 ASSESSMENT — LOCATION SIMPLE DESCRIPTION DERM
LOCATION SIMPLE: LEFT THIGH
LOCATION SIMPLE: RIGHT POSTERIOR UPPER ARM
LOCATION SIMPLE: RIGHT UPPER BACK
LOCATION SIMPLE: RIGHT THIGH
LOCATION SIMPLE: LEFT FOREARM

## 2020-10-07 ASSESSMENT — LOCATION ZONE DERM
LOCATION ZONE: LEG
LOCATION ZONE: ARM
LOCATION ZONE: TRUNK

## 2020-10-07 ASSESSMENT — LOCATION DETAILED DESCRIPTION DERM
LOCATION DETAILED: RIGHT PROXIMAL POSTERIOR UPPER ARM
LOCATION DETAILED: RIGHT INFERIOR UPPER BACK
LOCATION DETAILED: RIGHT MID-UPPER BACK
LOCATION DETAILED: LEFT PROXIMAL DORSAL FOREARM
LOCATION DETAILED: RIGHT ANTERIOR DISTAL THIGH
LOCATION DETAILED: LEFT ANTERIOR MEDIAL DISTAL THIGH

## 2021-02-04 ENCOUNTER — OFFICE VISIT (OUTPATIENT)
Dept: NEUROLOGY | Facility: MEDICAL CENTER | Age: 66
End: 2021-02-04
Attending: PSYCHIATRY & NEUROLOGY
Payer: COMMERCIAL

## 2021-02-04 VITALS
WEIGHT: 132.94 LBS | TEMPERATURE: 97.5 F | OXYGEN SATURATION: 99 % | HEART RATE: 75 BPM | BODY MASS INDEX: 18.61 KG/M2 | DIASTOLIC BLOOD PRESSURE: 82 MMHG | SYSTOLIC BLOOD PRESSURE: 124 MMHG | RESPIRATION RATE: 14 BRPM | HEIGHT: 71 IN

## 2021-02-04 DIAGNOSIS — G24.9 DYSTONIA, UNSPECIFIED: ICD-10-CM

## 2021-02-04 PROCEDURE — 99204 OFFICE O/P NEW MOD 45 MIN: CPT | Performed by: PSYCHIATRY & NEUROLOGY

## 2021-02-04 PROCEDURE — 99202 OFFICE O/P NEW SF 15 MIN: CPT | Performed by: PSYCHIATRY & NEUROLOGY

## 2021-02-04 ASSESSMENT — FIBROSIS 4 INDEX: FIB4 SCORE: 0.59

## 2021-02-04 NOTE — PATIENT INSTRUCTIONS
I have prescribed a medication called carbidopa/levodopa for your muscle clenching. Take 1 tablet of this 3x/day until the next follow-up.     Please record a video of the abnormal movements prior to the next visit.

## 2021-02-04 NOTE — PROGRESS NOTES
Chief Complaint   Patient presents with   • New Patient     Dystonia       History of present illness:  Shefali Mazaover 65 y.o. female with recurrent muscle cramps in her arms/legs that are painful and debilitating.  It started with cramping of her hands 7 years ago that is painful but was intermittent and infrequent and might be in remission for weeks. It started with a single hand and now this has progressed to both arms and both legs. It begins with sharp pain and develops to clenching of her hands and feet. Where her hands are stuck in a flexed position. She has to push down on her legs to force them to relax. Her calf becomes rigid during these episodes. If this is severe, she is unable to use her phone. This is occurring daily now and affects different parts of her body. This is most often at night. This may wake her up 3-4 times at night.   There is no family history of similar symptoms.     Past medical history:   Past Medical History:   Diagnosis Date   • Adverse effect of anesthesia     pt has had cevical neck surgery and canot bend neck back at all   • Asthma     history of bronchial spasms, but better since juicing   • Bronchitis    • Chicken pox    • Diastolic dysfunction    • H/O measles    • H/O mumps    • High cholesterol    • History of chickenpox    • Hypertension    • Measles    • MRSA (methicillin resistant Staphylococcus aureus) 2007    after abdominal exploratory lap, in [place]   • Mumps    • Pneumonia 2014    Has had frequently, but none since 2014   • Shingles        Past surgical history:   Past Surgical History:   Procedure Laterality Date   • GASTROSCOPY N/A 7/16/2019    Procedure: GASTROSCOPY;  Surgeon: Joao Solis M.D.;  Location: SURGERY [facility];  Service: Gen Robotic   • ZZZ CARDIAC CATH  12/29/16    right heart cath normal   • OTHER NEUROLOGICAL SURG  2006    anterior and posterior lumbar fusion L4-S1, [place]   • ABDOMINAL EXPLORATION     • CERVICAL FUSION  POSTERIOR      problems moving neck back   • GYN SURGERY      c section   • LAMINOTOMY     • OTHER ABDOMINAL SURGERY  [id number]    several abdominal,before and after anterior lumbar fusion to remove scar tissue   • OTHER CARDIAC SURGERY       3 coronary angiograms       Family history:   Family History   Problem Relation Age of Onset   • Cancer Mother    • Diabetes Mother    • Heart Disease Mother    • Heart Disease Father 76        CABG   • Diabetes Father    • Heart Attack Brother 50        MI   • Diabetes Other    • Heart Disease Other        Social history:   Social History     Socioeconomic History   • Marital status:      Spouse name: Not on file   • Number of children: Not on file   • Years of education: Not on file   • Highest education level: Not on file   Occupational History   • Not on file   Social Needs   • Financial resource strain: Not on file   • Food insecurity     Worry: Not on file     Inability: Not on file   • Transportation needs     Medical: Not on file     Non-medical: Not on file   Tobacco Use   • Smoking status: Never Smoker   • Smokeless tobacco: Never Used   Substance and Sexual Activity   • Alcohol use: Yes     Alcohol/week: 0.6 oz     Types: 1 Glasses of wine per week   • Drug use: No   • Sexual activity: Not on file   Lifestyle   • Physical activity     Days per week: Not on file     Minutes per session: Not on file   • Stress: Not on file   Relationships   • Social connections     Talks on phone: Not on file     Gets together: Not on file     Attends Christian service: Not on file     Active member of club or organization: Not on file     Attends meetings of clubs or organizations: Not on file     Relationship status: Not on file   • Intimate partner violence     Fear of current or ex partner: Not on file     Emotionally abused: Not on file     Physically abused: Not on file     Forced sexual activity: Not on file   Other Topics Concern   • Not on file   Social History  "Narrative   • Not on file       Current medications:   Current Outpatient Medications   Medication   • carbidopa-levodopa (SINEMET)  MG Tab   • loratadine (CLARITIN) 10 MG Tab   • levothyroxine (SYNTHROID) 25 MCG Tab   • carisoprodol (SOMA) 350 MG Tab   • albuterol 108 (90 Base) MCG/ACT Aero Soln inhalation aerosol   • Azelastine-Fluticasone 137-50 MCG/ACT Suspension   • amLODIPine (NORVASC) 5 MG Tab   • montelukast (SINGULAIR) 10 MG Tab   • omeprazole (PRILOSEC) 20 MG delayed-release capsule   • ciclopirox (PENLAC) 8 % solution   • hydroCHLOROthiazide (HYDRODIURIL) 25 MG Tab   • SPIRIVA RESPIMAT 1.25 MCG/ACT Aero Soln   • potassium chloride SA (KDUR) 20 MEQ Tab CR   • spironolactone (ALDACTONE) 25 MG Tab   • Cholecalciferol (VITAMIN D) 2000 UNIT Tab   • predniSONE (DELTASONE) 20 MG Tab   • Fluticasone Furoate-Vilanterol (BREO) 200-25 MCG/INH AEROSOL POWDER, BREATH ACTIVATED   • Aspirin Buf,CaCarb-MgCarb-MgO, 81 MG Tab   • REPATHA 140 MG/ML Solution Prefilled Syringe   • Coenzyme Q10 (CO Q 10) 100 MG Cap     No current facility-administered medications for this visit.        Medication Allergy:  Allergies   Allergen Reactions   • Iodine Rash     Rash to topical   • Chlorhexidine Rash     Per patient    • Apple      Raw apples   • Codeine Vomiting   • Erythromycin Rash   • Nitrofurantoin Nausea     abdo pain, nausea   • Statins [Hmg-Coa-R Inhibitors]      Unable to tolerate   • Sulfamethoxazole W-Trimethoprim Rash     Per patient   • Trazodone    • Ultram [Tramadol] Vomiting     Vomiting, extreme     Physical examination:   Vitals:    02/04/21 1047 02/04/21 1053   BP: 124/80 124/82   BP Location: Left arm Left arm   Patient Position: Sitting Standing   BP Cuff Size: Adult Adult   Pulse: 75 75   Resp: 14    Temp: 36.4 °C (97.5 °F)    TempSrc: Temporal    SpO2: 99% 99%   Weight: 60.3 kg (132 lb 15 oz)    Height: 1.798 m (5' 10.8\")      Neurological Exam  Mental Status  Awake, alert and oriented to person, place " and time. Speech is normal. Language is fluent with no aphasia.    Cranial Nerves  CN III, IV, VI: Extraocular movements intact bilaterally. No nystagmus. Normal smooth pursuit. Normal lids and orbits bilaterally.    Motor  Normal muscle bulk throughout. Normal muscle tone. No abnormal involuntary movements. Strength is 5/5 throughout all four extremities.  Normal repetitive finger tapping, hand opening closing and pronation/supination. Normal repetitive foot tapping and heel tapping. .    Reflexes                                           Right                      Left  Brachioradialis                    3+                         3+  Biceps                                 3+                         3+  Triceps                                3+                         3+  Patellar                                3+                         3+    Coordination  Right: Finger-to-nose normal. Heel-to-shin normal.  Left: Finger-to-nose normal. Heel-to-shin normal.    Gait  Casual gait is normal including stance, stride, and arm swing. Normal tandem gait.      Imaging:   10/29/2020 MRI BRAIN: I reviewed the images personally. This is a normal brain scan.   10/29/2020 MRI CERVICAL SPINE:  Anterior fusion at C3-C4 and posterior fusion with decompressive laminectomy at C3, C4, C5, C6 and C7.  Minimal myelomalacia which appears stable at C6.  Left C5-C6 foraminal narrowing could impinge upon the C6 nerve rootlet, and foraminal stenosis bilaterally at C6-7 could impinge upon the exiting C7 nerve rootlets.    ASSESSMENT AND PLAN:  Problem List Items Addressed This Visit     Dystonia, unspecified    Relevant Medications    carbidopa-levodopa (SINEMET)  MG Tab          1. Dystonia, unspecified  - carbidopa-levodopa (SINEMET)  MG Tab; Take 1 Tab by mouth 3 times a day.  Dispense: 180 Tab; Refill: 0    This is a 65 year old female with intermittent clenching of her arms and legs that is painful and very debilitating for  the patient. This has been ongoing for 7 years, started in one limb and now is generalized. I have requested that the patient obtain a video of the abnormal movements as I did not witness the abnormal movements during the visit today. I have counseled the patient that I am unsure of the cause of her symptoms however I would like to trial carbidopa/levodopa for the next 2 months. If there is no benefit with carb/levo, I will not continue it at the next follow-up. Baclofen may be an option for symptomatic treatment. This is an odd description for an organic movement disorder and may be functional but I did not mention this during this visit.     FOLLOW-UP:   Return in about 2 months (around 4/4/2021).    Total time spent for the day for this patient is: 46 minutes. I spent 32 minutes in face to face time and I spent 4 minutes pre-charting and 10 minutes in post-visit documentation.      HELIO BrunerO.  [facility]   Movement Disorders Specialist

## 2021-04-01 ENCOUNTER — OFFICE VISIT (OUTPATIENT)
Dept: NEUROLOGY | Facility: MEDICAL CENTER | Age: 66
End: 2021-04-01
Attending: PSYCHIATRY & NEUROLOGY
Payer: COMMERCIAL

## 2021-04-01 VITALS
SYSTOLIC BLOOD PRESSURE: 124 MMHG | WEIGHT: 151 LBS | RESPIRATION RATE: 14 BRPM | OXYGEN SATURATION: 99 % | HEART RATE: 78 BPM | BODY MASS INDEX: 21.62 KG/M2 | DIASTOLIC BLOOD PRESSURE: 78 MMHG | TEMPERATURE: 97.4 F | HEIGHT: 70 IN

## 2021-04-01 DIAGNOSIS — G24.8 PAROXYSMAL DYSTONIA: ICD-10-CM

## 2021-04-01 PROCEDURE — 99212 OFFICE O/P EST SF 10 MIN: CPT | Performed by: PSYCHIATRY & NEUROLOGY

## 2021-04-01 PROCEDURE — 99215 OFFICE O/P EST HI 40 MIN: CPT | Performed by: PSYCHIATRY & NEUROLOGY

## 2021-04-01 ASSESSMENT — FIBROSIS 4 INDEX: FIB4 SCORE: 0.59

## 2021-04-01 ASSESSMENT — PATIENT HEALTH QUESTIONNAIRE - PHQ9: CLINICAL INTERPRETATION OF PHQ2 SCORE: 0

## 2021-04-01 NOTE — PROGRESS NOTES
Chief Complaint   Patient presents with   • Follow-Up     Dystonia       History of present illness:  Shefali Mazaover 65 y.o. female with recurrent muscle cramps in her arms/legs for 7 years. Her hands may become stuck in a flexed position and she has to push down on her legs to force them to relax.   When I saw her on 2/4/21 this was occurring daily but I did not witness an event in the exam room.   I started her on a carbidopa/levodopa trial. Carbidopa/levodopa was ineffective.     There were multiple videos that I was shown today in the exam room:   There was a video where she was in bed and had paroxysmal brief repeated episodes of internal rotation/plantar flexion of the right leg   There was a video where she was shopping and had flexion of the left MCP joints and finger flexion.     She has had fewer episodes since starting video exercises. Stretching may trigger this. Exercise improves her symptoms. There is hoarseness of her voice.   Attacks last up to 20 minutes. Attacks may occur twice daily. A sharp pain and tingling occurs in her hand prior to the onset of contractions.     Her mother used to have leg cramps.     Past medical history:   Past Medical History:   Diagnosis Date   • Adverse effect of anesthesia     pt has had cevical neck surgery and canot bend neck back at all   • Asthma     history of bronchial spasms, but better since juicing   • Bronchitis    • Chicken pox    • Diastolic dysfunction    • H/O measles    • H/O mumps    • High cholesterol    • History of chickenpox    • Hypertension    • Measles    • MRSA (methicillin resistant Staphylococcus aureus) 2007    after abdominal exploratory lap, in [place]   • Mumps    • Pneumonia 2014    Has had frequently, but none since 2014   • Shingles        Past surgical history:   Past Surgical History:   Procedure Laterality Date   • GASTROSCOPY N/A 7/16/2019    Procedure: GASTROSCOPY;  Surgeon: Joao Solis M.D.;  Location: [facility];  Service: Gen Robotic   • ZZZ CARDIAC CATH  12/29/16    right heart cath normal   • OTHER NEUROLOGICAL SURG  2006    anterior and posterior lumbar fusion L4-S1, [place]   • ABDOMINAL EXPLORATION     • CERVICAL FUSION POSTERIOR      problems moving neck back   • GYN SURGERY      c section   • LAMINOTOMY     • OTHER ABDOMINAL SURGERY  [id number]    several abdominal,before and after anterior lumbar fusion to remove scar tissue   • OTHER CARDIAC SURGERY       3 coronary angiograms       Family history:   Family History   Problem Relation Age of Onset   • Cancer Mother    • Diabetes Mother    • Heart Disease Mother    • Heart Disease Father 76        CABG   • Diabetes Father    • Heart Attack Brother 50        MI   • Diabetes Other    • Heart Disease Other        Social history:   Social History     Socioeconomic History   • Marital status:      Spouse name: Not on file   • Number of children: Not on file   • Years of education: Not on file   • Highest education level: Not on file   Occupational History   • Not on file   Tobacco Use   • Smoking status: Never Smoker   • Smokeless tobacco: Never Used   Substance and Sexual Activity   • Alcohol use: Never     Alcohol/week: 0.6 oz     Types: 1 Glasses of wine per week   • Drug use: No   • Sexual activity: Not on file   Other Topics Concern   • Not on file   Social History Narrative   • Not on file     Social Determinants of Health     Financial Resource Strain:    • Difficulty of Paying Living Expenses:    Food Insecurity:    • Worried About Running Out of Food in the Last Year:    • Ran Out of Food in the Last Year:    Transportation Needs:    • Lack of Transportation (Medical):    • Lack of Transportation (Non-Medical):    Physical Activity:    • Days of Exercise per Week:    • Minutes of Exercise per Session:    Stress:    • Feeling of Stress :    Social Connections:    • Frequency of Communication with Friends and Family:    • Frequency of Social  Gatherings with Friends and Family:    • Attends [employer] Services:    • Active Member of Clubs or Organizations:    • Attends Club or Organization Meetings:    • Marital Status:    Intimate Partner Violence:    • Fear of Current or Ex-Partner:    • Emotionally Abused:    • Physically Abused:    • Sexually Abused:        Current medications:   Current Outpatient Medications   Medication   • temazepam (RESTORIL) 30 MG capsule   • amLODIPine (NORVASC) 10 MG Tab   • ciclopirox (PENLAC) 8 % solution   • Fluticasone Furoate-Vilanterol (BREO) 200-25 MCG/INH AEROSOL POWDER, BREATH ACTIVATED   • loratadine (CLARITIN) 10 MG Tab   • levothyroxine (SYNTHROID) 25 MCG Tab   • albuterol 108 (90 Base) MCG/ACT Aero Soln inhalation aerosol   • Azelastine-Fluticasone 137-50 MCG/ACT Suspension   • montelukast (SINGULAIR) 10 MG Tab   • omeprazole (PRILOSEC) 20 MG delayed-release capsule   • hydroCHLOROthiazide (HYDRODIURIL) 25 MG Tab   • REPATHA 140 MG/ML Solution Prefilled Syringe   • SPIRIVA RESPIMAT 1.25 MCG/ACT Aero Soln   • potassium chloride SA (KDUR) 20 MEQ Tab CR   • spironolactone (ALDACTONE) 25 MG Tab   • Coenzyme Q10 (CO Q 10) 100 MG Cap   • Cholecalciferol (VITAMIN D) 2000 UNIT Tab   • doxycycline (VIBRAMYCIN) 100 MG Tab     No current facility-administered medications for this visit.       Medication Allergy:  Allergies   Allergen Reactions   • Iodine Rash     Rash to topical   • Chlorhexidine Rash     Per patient    • Apple      Raw apples   • Carbidopa-Levodopa      Rash/swelling of lips/face   • Codeine Vomiting   • Erythromycin Rash   • Hydrocodone-Acetaminophen Nausea   • Nitrofurantoin Nausea     abdo pain, nausea   • Statins [Hmg-Coa-R Inhibitors]      Unable to tolerate   • Sulfamethoxazole W-Trimethoprim Rash     Per patient   • Trazodone    • Ultram [Tramadol] Vomiting     Vomiting, extreme       Physical examination:   Vitals:    04/01/21 1105   BP: 124/78   BP Location: Left arm   Patient Position: Sitting  "  BP Cuff Size: Adult   Pulse: 78   Resp: 14   Temp: 36.3 °C (97.4 °F)   TempSrc: Temporal   SpO2: 99%   Weight: 68.5 kg (151 lb)   Height: 1.778 m (5' 10\")     Neurological Exam  Mental Status  Awake and alert. Speech is normal. Language is fluent with no aphasia.    Motor  Normal muscle bulk throughout. Normal muscle tone. No abnormal involuntary movements. Strength is 5/5 throughout all four extremities.  Normal speed of rapid finger tapping and hand opening/closing. .    Reflexes                                           Right                      Left  Brachioradialis                    3+                         3+  Biceps                                 3+                         3+  Triceps                                3+                         3+  Patellar                                3+                         3+  Achilles                                3+                         3+    Right pathological reflexes: Darwin's present.  Left pathological reflexes: Darwin's present.    Coordination  Right: Rapid alternating movement normal.  Left: Rapid alternating movement normal.    Gait  Casual gait is normal including stance, stride, and arm swing.    Labs:  None     Imaging:   10/29/2020 MRI BRAIN: I reviewed the images personally. This is a normal brain scan.   10/29/2020 MRI CERVICAL SPINE:  Anterior fusion at C3-C4 and posterior fusion with decompressive laminectomy at C3, C4, C5, C6 and C7.  Minimal myelomalacia which appears stable at C6.  Left C5-C6 foraminal narrowing could impinge upon the C6 nerve rootlet, and foraminal stenosis bilaterally at C6-7 could impinge upon the exiting C7 nerve rootlets.    ASSESSMENT AND PLAN:  Problem List Items Addressed This Visit     Paroxysmal dystonia    Relevant Orders    REFERRAL TO NEURODIAGNOSTICS (EEG,EP,EMG/NCS/DBS)          1. Paroxysmal dystonia  - REFERRAL TO NEURODIAGNOSTICS (EEG,EP,EMG/NCS/DBS)    There are paroxysmal episodes of dystonic like " movements with inversion of the right foot and clenching of the hands sometimes waking her up from sleep but also occurring when awake, shopping in the store. I have counseled her that this history is atypical for a neurodegenerative cause of dystonia and that there are rare genetic etiologies of paroxysmal dystonia that typically occur in childhood and are associated with a positive family history that she does not have.   I am unable to provide a definitive diagnosis however she has had normal brain imaging and her neurologic exam is normal in the exam room.   I have ordered a EEG to exclude an epileptic etiology of these spells.   I offered carbamazepine as this has been used in paroxysmal dyskinesias however she refused.     FOLLOW-UP:   Return in about 4 months (around 8/1/2021).    Total time spent for the day for this patient is: 53 minutes. I spent 39 minutes in face to face time and I spent 10 minutes pre-charting and 4 minutes in post-visit documentation.      Dr. Rudolph Hernandez D.O.  [facility]   Movement Disorders Specialist

## 2021-06-09 ENCOUNTER — APPOINTMENT (RX ONLY)
Dept: URBAN - METROPOLITAN AREA CLINIC 31 | Facility: CLINIC | Age: 66
Setting detail: DERMATOLOGY
End: 2021-06-09

## 2021-06-09 DIAGNOSIS — L85.3 XEROSIS CUTIS: ICD-10-CM

## 2021-06-09 DIAGNOSIS — D69.2 OTHER NONTHROMBOCYTOPENIC PURPURA: ICD-10-CM

## 2021-06-09 DIAGNOSIS — R21 RASH AND OTHER NONSPECIFIC SKIN ERUPTION: ICD-10-CM

## 2021-06-09 PROCEDURE — 99213 OFFICE O/P EST LOW 20 MIN: CPT

## 2021-06-09 PROCEDURE — ? ADDITIONAL NOTES

## 2021-06-09 PROCEDURE — ? COUNSELING

## 2021-06-09 PROCEDURE — ? PRESCRIPTION

## 2021-06-09 RX ORDER — TRIAMCINOLONE ACETONIDE 1 MG/G
CREAM TOPICAL BID
Qty: 1 | Refills: 3 | Status: ERX

## 2021-06-09 RX ORDER — TRIAMCINOLONE ACETONIDE 1 MG/G
CREAM TOPICAL BID
Qty: 1 | Refills: 3 | Status: ERX | COMMUNITY
Start: 2021-06-09

## 2021-06-09 RX ADMIN — TRIAMCINOLONE ACETONIDE: 1 CREAM TOPICAL at 00:00

## 2021-06-09 ASSESSMENT — LOCATION SIMPLE DESCRIPTION DERM
LOCATION SIMPLE: SCALP
LOCATION SIMPLE: RIGHT FOREARM
LOCATION SIMPLE: LEFT FOREARM
LOCATION SIMPLE: RIGHT PRETIBIAL REGION
LOCATION SIMPLE: LEFT PRETIBIAL REGION

## 2021-06-09 ASSESSMENT — LOCATION DETAILED DESCRIPTION DERM
LOCATION DETAILED: RIGHT PROXIMAL DORSAL FOREARM
LOCATION DETAILED: LEFT DISTAL PRETIBIAL REGION
LOCATION DETAILED: LEFT PROXIMAL DORSAL FOREARM
LOCATION DETAILED: LEFT SUPERIOR PARIETAL SCALP
LOCATION DETAILED: RIGHT DISTAL PRETIBIAL REGION

## 2021-06-09 ASSESSMENT — LOCATION ZONE DERM
LOCATION ZONE: SCALP
LOCATION ZONE: LEG
LOCATION ZONE: ARM

## 2021-06-09 NOTE — HPI: EVALUATION OF SKIN LESION(S)
What Type Of Note Output Would You Prefer (Optional)?: Standard Output
Hpi Title: Evaluation of Skin Lesions
Additional History: Patient has used hydrocortisone and has since improved

## 2021-06-09 NOTE — PROCEDURE: ADDITIONAL NOTES
Render Risk Assessment In Note?: no
Additional Notes: Discussed with patient that she is to call and return to clinic when it flares for further management
Detail Level: Simple

## 2022-04-13 PROBLEM — R00.1 BRADYCARDIA: Status: ACTIVE | Noted: 2022-03-11

## 2022-05-05 ENCOUNTER — APPOINTMENT (RX ONLY)
Dept: URBAN - METROPOLITAN AREA CLINIC 31 | Facility: CLINIC | Age: 67
Setting detail: DERMATOLOGY
End: 2022-05-05

## 2022-05-05 DIAGNOSIS — L93.0 DISCOID LUPUS ERYTHEMATOSUS: ICD-10-CM

## 2022-05-05 DIAGNOSIS — L91.0 HYPERTROPHIC SCAR: ICD-10-CM

## 2022-05-05 PROBLEM — L30.9 DERMATITIS, UNSPECIFIED: Status: ACTIVE | Noted: 2022-05-05

## 2022-05-05 PROBLEM — D23.62 OTHER BENIGN NEOPLASM OF SKIN OF LEFT UPPER LIMB, INCLUDING SHOULDER: Status: ACTIVE | Noted: 2022-05-05

## 2022-05-05 PROCEDURE — 11104 PUNCH BX SKIN SINGLE LESION: CPT

## 2022-05-05 PROCEDURE — ? BIOPSY BY PUNCH METHOD

## 2022-05-05 PROCEDURE — ? COUNSELING

## 2022-05-05 PROCEDURE — 99212 OFFICE O/P EST SF 10 MIN: CPT | Mod: 25

## 2022-05-05 ASSESSMENT — LOCATION SIMPLE DESCRIPTION DERM
LOCATION SIMPLE: LEFT UPPER ARM
LOCATION SIMPLE: LEFT SCALP

## 2022-05-05 ASSESSMENT — LOCATION ZONE DERM
LOCATION ZONE: ARM
LOCATION ZONE: SCALP

## 2022-05-05 ASSESSMENT — LOCATION DETAILED DESCRIPTION DERM
LOCATION DETAILED: LEFT MEDIAL FRONTAL SCALP
LOCATION DETAILED: LEFT ANTERIOR DISTAL UPPER ARM

## 2022-05-06 ENCOUNTER — APPOINTMENT (RX ONLY)
Dept: URBAN - METROPOLITAN AREA CLINIC 31 | Facility: CLINIC | Age: 67
Setting detail: DERMATOLOGY
End: 2022-05-06

## 2022-05-06 DIAGNOSIS — R52 PAIN, UNSPECIFIED: ICD-10-CM

## 2022-05-06 DIAGNOSIS — Z00.00 ENCOUNTER FOR GENERAL ADULT MEDICAL EXAMINATION WITHOUT ABNORMAL FINDINGS: ICD-10-CM

## 2022-05-06 PROBLEM — Z71.1 PERSON WITH FEARED HEALTH COMPLAINT IN WHOM NO DIAGNOSIS IS MADE: Status: ACTIVE | Noted: 2022-05-06

## 2022-05-06 PROCEDURE — ? ADDITIONAL NOTES

## 2022-05-06 PROCEDURE — ? PATIENT SPECIFIC COUNSELING

## 2022-05-06 PROCEDURE — 99212 OFFICE O/P EST SF 10 MIN: CPT

## 2022-05-06 PROCEDURE — ? COUNSELING

## 2022-05-06 ASSESSMENT — LOCATION DETAILED DESCRIPTION DERM
LOCATION DETAILED: MID-FRONTAL SCALP
LOCATION DETAILED: RIGHT MEDIAL FOREHEAD
LOCATION DETAILED: SUPERIOR MID FOREHEAD

## 2022-05-06 ASSESSMENT — LOCATION SIMPLE DESCRIPTION DERM
LOCATION SIMPLE: SUPERIOR FOREHEAD
LOCATION SIMPLE: ANTERIOR SCALP
LOCATION SIMPLE: RIGHT FOREHEAD

## 2022-05-06 ASSESSMENT — LOCATION ZONE DERM
LOCATION ZONE: FACE
LOCATION ZONE: SCALP

## 2022-05-06 NOTE — PROCEDURE: ADDITIONAL NOTES
Additional Notes: Normal appearing skin.\\nPanna's pain is likely neurological.\\nNo evidence of Shingles, especially given 2 weeks duration\\nAdvise patient to go to emergency room for further evaluation. She needs to be seen today.\\Pa refused to go there directly because she has to be be in [place], [place] at 4:30, for a walkthrough for a real estate deal. She refused medical advice and stated that she will go to the ER after her meeting.
Detail Level: Detailed
Render Risk Assessment In Note?: no

## 2022-05-13 ENCOUNTER — APPOINTMENT (RX ONLY)
Dept: URBAN - METROPOLITAN AREA CLINIC 31 | Facility: CLINIC | Age: 67
Setting detail: DERMATOLOGY
End: 2022-05-13

## 2022-05-13 DIAGNOSIS — L259 CONTACT DERMATITIS AND OTHER ECZEMA, UNSPECIFIED CAUSE: ICD-10-CM

## 2022-05-13 DIAGNOSIS — Z48.02 ENCOUNTER FOR REMOVAL OF SUTURES: ICD-10-CM

## 2022-05-13 PROBLEM — D23.62 OTHER BENIGN NEOPLASM OF SKIN OF LEFT UPPER LIMB, INCLUDING SHOULDER: Status: ACTIVE | Noted: 2022-05-13

## 2022-05-13 PROBLEM — L23.9 ALLERGIC CONTACT DERMATITIS, UNSPECIFIED CAUSE: Status: ACTIVE | Noted: 2022-05-13

## 2022-05-13 PROCEDURE — ? ADDITIONAL NOTES

## 2022-05-13 PROCEDURE — ? SUTURE REMOVAL (GLOBAL PERIOD)

## 2022-05-13 PROCEDURE — 99213 OFFICE O/P EST LOW 20 MIN: CPT

## 2022-05-13 PROCEDURE — ? COUNSELING

## 2022-05-13 ASSESSMENT — LOCATION ZONE DERM: LOCATION ZONE: SCALP

## 2022-05-13 ASSESSMENT — LOCATION DETAILED DESCRIPTION DERM
LOCATION DETAILED: LEFT MEDIAL FRONTAL SCALP
LOCATION DETAILED: POSTERIOR MID-PARIETAL SCALP

## 2022-05-13 ASSESSMENT — LOCATION SIMPLE DESCRIPTION DERM
LOCATION SIMPLE: LEFT SCALP
LOCATION SIMPLE: POSTERIOR SCALP

## 2022-05-13 NOTE — PROCEDURE: ADDITIONAL NOTES
Additional Notes: Pt instructed to f/u with PCP in the event of reoccurring painful sx, as there are no findings in the skin of her scalp to explain herpain, as there is no evidence of any pathology on prior exams, today's exam, or in pathology from biopsy. Biopsy shows mild allergic response to topical she used for shingles, or, less likely, an allergy to her hair dye.\\nDiscussed patch testing to r/o allergy to hair dye. Pt elects patch testing.\\nPt to be scheduled once PA is approved.
Render Risk Assessment In Note?: no
Detail Level: Simple

## 2022-05-13 NOTE — PROCEDURE: SUTURE REMOVAL (GLOBAL PERIOD)
Detail Level: Detailed
Add 09055 Cpt? (Important Note: In 2017 The Use Of 33498 Is Being Tracked By Cms To Determine Future Global Period Reimbursement For Global Periods): no

## 2022-08-08 PROBLEM — I51.89 DIASTOLIC DYSFUNCTION: Status: ACTIVE | Noted: 2022-08-08

## 2022-08-08 PROBLEM — D80.4 IGM DEFICIENCY (HCC): Status: ACTIVE | Noted: 2022-08-08

## 2022-08-08 PROBLEM — D80.3 IGG DEFICIENCY (HCC): Status: ACTIVE | Noted: 2022-08-08

## 2023-02-10 PROBLEM — R07.81 RIB PAIN: Status: ACTIVE | Noted: 2023-02-10

## 2023-02-10 PROBLEM — G89.29 CHRONIC SCAPULAR PAIN: Status: ACTIVE | Noted: 2023-02-10

## 2023-02-10 PROBLEM — M89.8X1 CHRONIC SCAPULAR PAIN: Status: ACTIVE | Noted: 2023-02-10

## 2023-02-10 PROBLEM — Z98.1 HX OF FUSION OF CERVICAL SPINE: Status: ACTIVE | Noted: 2023-02-10
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
## 2023-07-05 PROBLEM — E11.9 TYPE 2 DIABETES MELLITUS WITHOUT COMPLICATION, WITHOUT LONG-TERM CURRENT USE OF INSULIN (HCC): Status: ACTIVE | Noted: 2023-07-05

## 2023-07-05 PROBLEM — F07.81 POSTCONCUSSION SYNDROME: Status: ACTIVE | Noted: 2023-07-05

## 2023-09-10 ENCOUNTER — OFFICE VISIT (OUTPATIENT)
Dept: URGENT CARE | Facility: CLINIC | Age: 68
End: 2023-09-10
Payer: MEDICARE

## 2023-09-10 VITALS
OXYGEN SATURATION: 94 % | WEIGHT: 125 LBS | RESPIRATION RATE: 16 BRPM | SYSTOLIC BLOOD PRESSURE: 122 MMHG | HEIGHT: 69 IN | BODY MASS INDEX: 18.51 KG/M2 | DIASTOLIC BLOOD PRESSURE: 74 MMHG | TEMPERATURE: 98.6 F | HEART RATE: 90 BPM

## 2023-09-10 DIAGNOSIS — U07.1 COVID-19: ICD-10-CM

## 2023-09-10 PROCEDURE — 3074F SYST BP LT 130 MM HG: CPT

## 2023-09-10 PROCEDURE — 99203 OFFICE O/P NEW LOW 30 MIN: CPT

## 2023-09-10 PROCEDURE — 3078F DIAST BP <80 MM HG: CPT

## 2023-09-10 ASSESSMENT — ENCOUNTER SYMPTOMS
HEADACHES: 1
COUGH: 1
SPUTUM PRODUCTION: 1
FEVER: 0

## 2023-09-10 ASSESSMENT — FIBROSIS 4 INDEX: FIB4 SCORE: 0.7

## 2023-09-11 NOTE — PROGRESS NOTES
Subjective:     CHIEF COMPLAINT  Chief Complaint   Patient presents with    Coronavirus Screening     Tested positive sat for covid        HPI  Shefali Ryan is a very pleasant 67 y.o. female who presents after testing positive for COVID at home on Saturday.  She reports that she started feeling unwell on Thursday.  She returned from [place] 1 week ago and her  has been sick with similar symptoms.  She thought that she had a upper respiratory tract infection and was seen in the urgent care on Friday and tested negative for the flu, but was not tested for COVID at that time.  She also had a chest x-ray performed with normal findings.  She was provided a prescription for prednisone and benzonatate which she has been taking for 2 days with minimal relief of symptoms.  She has a history of CHF and several autoimmune issues.  She has had a productive cough with fatigue.  She has been able to tolerate food and liquids.    REVIEW OF SYSTEMS  Review of Systems   Constitutional:  Positive for malaise/fatigue. Negative for fever.   Respiratory:  Positive for cough and sputum production.    Cardiovascular:  Negative for chest pain.   Neurological:  Positive for headaches.       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    Diastolic dysfunction 08/08/2022    Bradycardia 03/11/2022    Paroxysmal dystonia 02/04/2021    Mixed hyperlipidemia 06/17/2019    Mild persistent asthma 12/12/2018    Status post cervical spinal fusion 05/09/2018    Cervical radiculopathy 11/10/2017    Low vitamin D level 08/10/2017    Baker's cyst 02/05/2016    Environmental  allergies 02/05/2016    Elevated glucose 05/29/2015    Essential hypertension 09/04/2014    Insomnia 09/04/2014    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    ALLERGIES  Allergies   Allergen Reactions    Alirocumab Rash    Chlorhexidine Rash     Per patient     Apple Unspecified     Raw apples  Raw apples  Raw apples    Carbidopa-Levodopa      Rash/swelling of lips/face    Codeine Vomiting    Erythromycin Rash    Hydrocodone-Acetaminophen Nausea    Nitrofurantoin Nausea     abdo pain, nausea    Statins [Hmg-Coa-R Inhibitors]      Unable to tolerate    Sulfamethoxazole W-Trimethoprim Rash     Per patient    Sulfamethoxazole-Trimethoprim Rash    Trazodone     Ultram [Tramadol] Vomiting     Vomiting, extreme       CURRENT MEDICATIONS  Home Medications       Reviewed by Uzma Mccauley P.A.-C. (Physician Assistant) on 09/10/23 at 1659  Med List Status: <None>     Medication Last Dose Status   albuterol (ACCUNEB) 0.63 MG/3ML nebulizer solution PRN Active   albuterol 108 (90 Base) MCG/ACT Aero Soln inhalation aerosol PRN Active   amLODIPine (NORVASC) 10 MG Tab Taking Active   Azelastine-Fluticasone 137-50 MCG/ACT Suspension Taking Active   benzonatate (TESSALON) 100 MG Cap PRN Active   benzonatate (TESSALON) 100 MG Cap PRN Active   Blood Glucose Test Strips Taking Active   BREO ELLIPTA 200-25 MCG/INH AEROSOL POWDER, BREATH ACTIVATED  Active   Cholecalciferol (VITAMIN D) 2000 UNIT Tab Taking Active   Coenzyme Q10 (CO Q 10) 100 MG Cap Taking Active   estradiol (ESTRACE) 0.1 MG/GM vaginal cream  Active   Evolocumab, REPATHA, (REPATHA SURECLICK) 140 MG/ML Solution Auto-injector Taking Active   glucose blood (CONTOUR NEXT  "TEST) strip  Active   hydroCHLOROthiazide (HYDRODIURIL) 25 MG Tab Taking Active   ipratropium-albuterol (DUONEB) 0.5-2.5 (3) MG/3ML nebulizer solution PRN Active   levothyroxine (SYNTHROID) 25 MCG Tab Taking Active   loratadine (CLARITIN) 10 MG Tab PRN Active   omeprazole (PRILOSEC) 20 MG delayed-release capsule Taking Active   potassium chloride SA (KDUR) 20 MEQ Tab CR Taking Active   predniSONE (DELTASONE) 10 MG Tab Taking Active   SPIRIVA RESPIMAT 1.25 MCG/ACT Aero Soln Taking Active   spironolactone (ALDACTONE) 50 MG Tab Taking Active   temazepam (RESTORIL) 30 MG capsule Taking Active                    SOCIAL HISTORY  Social History     Tobacco Use    Smoking status: Never    Smokeless tobacco: Never   Vaping Use    Vaping Use: Never used   Substance and Sexual Activity    Alcohol use: Yes     Alcohol/week: 0.6 oz     Types: 1 Glasses of wine per week    Drug use: No    Sexual activity: Not on file       FAMILY HISTORY  Family History   Problem Relation Age of Onset    Cancer Mother     Diabetes Mother     Heart Disease Mother     Heart Disease Father 76        CABG    Diabetes Father     Heart Attack Brother 50        MI    Diabetes Other     Heart Disease Other           Objective:     VITAL SIGNS: /74   Pulse 90   Temp 37 °C (98.6 °F) (Temporal)   Resp 16   Ht 1.753 m (5' 9\")   Wt 56.7 kg (125 lb)   SpO2 94%   BMI 18.46 kg/m²     PHYSICAL EXAM  Physical Exam  Vitals reviewed.   Constitutional:       General: She is not in acute distress.     Appearance: Normal appearance. She is normal weight. She is ill-appearing. She is not toxic-appearing.   HENT:      Head: Normocephalic and atraumatic.      Right Ear: External ear normal.      Left Ear: External ear normal.      Nose: Nose normal.      Mouth/Throat:      Mouth: Mucous membranes are moist.   Eyes:      Extraocular Movements: Extraocular movements intact.      Conjunctiva/sclera: Conjunctivae normal.      Pupils: Pupils are equal, round, and " reactive to light.   Cardiovascular:      Rate and Rhythm: Normal rate and regular rhythm.      Heart sounds: Normal heart sounds.   Pulmonary:      Effort: Pulmonary effort is normal. No respiratory distress.      Breath sounds: No stridor. No wheezing, rhonchi or rales.   Musculoskeletal:         General: Normal range of motion.      Cervical back: Normal range of motion.   Skin:     General: Skin is warm and dry.      Coloration: Skin is not pale.   Neurological:      Mental Status: She is alert and oriented to person, place, and time.   Psychiatric:         Mood and Affect: Mood normal.         Assessment/Plan:     1. COVID-19  - molnupiravir 200 MG capsule; Take 4 Capsules by mouth 2 times a day for 5 days. CAUTION: DO NOT USE IF PREGNANT OR PLANNING TO BECOME PREGNANT  Dispense: 40 Capsule; Refill: 0  -Tylenol/ibuprofen OTC as needed for discomfort  -Discontinue prednisone  -Return to clinic/be seen at the ER if symptoms worsen  -Continue other medications as prescribed  -Isolate at home for the first 5 days from the onset of symptoms.  May return to normal activities on days 6 through 10 but must wear a mask.    MDM/Comments:  Patient has stable vital signs and is non-toxic appearing. Discussed supportive care with hydration, rest, Tylenol/Ibuprofen as needed.  Patient had positive COVID testing at home.  Given her history of autoimmune diseases, CHF, diabetes she is a good candidate for COVID antiviral medications.  Discussed the pros and cons of Paxlovid versus Lagevrio.  Given her current medications, she agreed that Lagevrio is a better option.  Repeat pulse ox obtained with a reading of 95%.  Strict ER precautions discussed with patient if her symptoms worsen.  Patient demonstrated understanding of treatment plan at this time and will RTC if symptoms worsen or fail to resolve.     Differential diagnosis, natural history, supportive care, and indications for immediate follow-up discussed. All questions  answered. Patient agrees with the plan of care.    Follow-up as needed if symptoms worsen or fail to improve to PCP, Urgent care or Emergency Room.    I have personally reviewed prior external notes and test results pertinent to today's visit.  I have independently reviewed and interpreted all diagnostics ordered during this urgent care acute visit.   Discussed management options (risks,benefits, and alternatives to treatment). Pt expresses understanding and the treatment plan was agreed upon. Questions were encouraged and answered to pt's satisfaction.    Please note that this dictation was created using voice recognition software. I have made a reasonable attempt to correct obvious errors, but I expect that there are errors of grammar and possibly content that I did not discover before finalizing the note.

## 2024-01-05 ENCOUNTER — APPOINTMENT (RX ONLY)
Dept: URBAN - METROPOLITAN AREA CLINIC 31 | Facility: CLINIC | Age: 69
Setting detail: DERMATOLOGY
End: 2024-01-05

## 2024-01-05 DIAGNOSIS — D22 MELANOCYTIC NEVI: ICD-10-CM

## 2024-01-05 DIAGNOSIS — Z87.2 PERSONAL HISTORY OF DISEASES OF THE SKIN AND SUBCUTANEOUS TISSUE: ICD-10-CM

## 2024-01-05 DIAGNOSIS — L82.1 OTHER SEBORRHEIC KERATOSIS: ICD-10-CM

## 2024-01-05 DIAGNOSIS — D18.0 HEMANGIOMA: ICD-10-CM

## 2024-01-05 DIAGNOSIS — L81.4 OTHER MELANIN HYPERPIGMENTATION: ICD-10-CM

## 2024-01-05 DIAGNOSIS — L57.0 ACTINIC KERATOSIS: ICD-10-CM

## 2024-01-05 DIAGNOSIS — Z71.89 OTHER SPECIFIED COUNSELING: ICD-10-CM

## 2024-01-05 PROBLEM — D22.5 MELANOCYTIC NEVI OF TRUNK: Status: ACTIVE | Noted: 2024-01-05

## 2024-01-05 PROBLEM — D18.01 HEMANGIOMA OF SKIN AND SUBCUTANEOUS TISSUE: Status: ACTIVE | Noted: 2024-01-05

## 2024-01-05 PROCEDURE — 17000 DESTRUCT PREMALG LESION: CPT

## 2024-01-05 PROCEDURE — ? COUNSELING

## 2024-01-05 PROCEDURE — 99213 OFFICE O/P EST LOW 20 MIN: CPT | Mod: 25

## 2024-01-05 PROCEDURE — 17003 DESTRUCT PREMALG LES 2-14: CPT

## 2024-01-05 PROCEDURE — ? LIQUID NITROGEN

## 2024-01-05 ASSESSMENT — LOCATION SIMPLE DESCRIPTION DERM
LOCATION SIMPLE: RIGHT UPPER BACK
LOCATION SIMPLE: CHEST
LOCATION SIMPLE: LEFT CHEEK
LOCATION SIMPLE: LEFT CLAVICULAR SKIN

## 2024-01-05 ASSESSMENT — LOCATION ZONE DERM
LOCATION ZONE: TRUNK
LOCATION ZONE: FACE

## 2024-01-05 ASSESSMENT — LOCATION DETAILED DESCRIPTION DERM
LOCATION DETAILED: LEFT CLAVICULAR SKIN
LOCATION DETAILED: LEFT MEDIAL SUPERIOR CHEST
LOCATION DETAILED: RIGHT SUPERIOR UPPER BACK
LOCATION DETAILED: RIGHT INFERIOR UPPER BACK
LOCATION DETAILED: RIGHT LATERAL UPPER BACK
LOCATION DETAILED: LEFT SUPERIOR CENTRAL BUCCAL CHEEK

## 2024-02-20 ENCOUNTER — OFFICE VISIT (OUTPATIENT)
Dept: URGENT CARE | Facility: CLINIC | Age: 69
End: 2024-02-20
Payer: MEDICARE

## 2024-02-20 VITALS
DIASTOLIC BLOOD PRESSURE: 84 MMHG | OXYGEN SATURATION: 96 % | HEIGHT: 69 IN | RESPIRATION RATE: 16 BRPM | TEMPERATURE: 98.5 F | SYSTOLIC BLOOD PRESSURE: 124 MMHG | BODY MASS INDEX: 22.07 KG/M2 | HEART RATE: 100 BPM | WEIGHT: 149 LBS

## 2024-02-20 DIAGNOSIS — H10.9 CONJUNCTIVITIS OF BOTH EYES, UNSPECIFIED CONJUNCTIVITIS TYPE: ICD-10-CM

## 2024-02-20 PROCEDURE — 3074F SYST BP LT 130 MM HG: CPT | Performed by: FAMILY MEDICINE

## 2024-02-20 PROCEDURE — 3079F DIAST BP 80-89 MM HG: CPT | Performed by: FAMILY MEDICINE

## 2024-02-20 PROCEDURE — 99213 OFFICE O/P EST LOW 20 MIN: CPT | Performed by: FAMILY MEDICINE

## 2024-02-20 RX ORDER — PREDNISOLONE ACETATE 10 MG/ML
1 SUSPENSION/ DROPS OPHTHALMIC 4 TIMES DAILY
Qty: 5 ML | Refills: 0 | Status: SHIPPED | OUTPATIENT
Start: 2024-02-20 | End: 2024-02-25

## 2024-02-20 RX ORDER — GENTAMICIN SULFATE 3 MG/ML
1 SOLUTION/ DROPS OPHTHALMIC EVERY 4 HOURS
Qty: 5 ML | Refills: 0 | Status: SHIPPED | OUTPATIENT
Start: 2024-02-20 | End: 2024-02-25

## 2024-02-20 ASSESSMENT — ENCOUNTER SYMPTOMS
EYE REDNESS: 1
RESPIRATORY NEGATIVE: 1
CARDIOVASCULAR NEGATIVE: 1
EYE DISCHARGE: 1
CONSTITUTIONAL NEGATIVE: 1
GASTROINTESTINAL NEGATIVE: 1

## 2024-02-20 ASSESSMENT — FIBROSIS 4 INDEX: FIB4 SCORE: 0.54

## 2024-02-20 NOTE — PROGRESS NOTES
"Subjective:   Shefali Ryan is a 68 y.o. female who presents for Other (Patient is having eye pain in both eyes, itching,  redness, feeling itchy all over body  2 weeks)      Other        Review of Systems   Constitutional: Negative.    HENT: Negative.     Eyes:  Positive for discharge and redness.   Respiratory: Negative.     Cardiovascular: Negative.    Gastrointestinal: Negative.    Genitourinary: Negative.        Medications, Allergies, and current problem list reviewed today in Epic.     Objective:     /84   Pulse 100   Temp 36.9 °C (98.5 °F) (Temporal)   Resp 16   Ht 1.753 m (5' 9\")   Wt 67.6 kg (149 lb)   SpO2 96%     Physical Exam  Vitals and nursing note reviewed.   Constitutional:       Appearance: Normal appearance.   HENT:      Head: Normocephalic and atraumatic.   Eyes:      General:         Right eye: Discharge present.         Left eye: Discharge present.  Neurological:      Mental Status: She is alert.         Assessment/Plan:     Diagnosis and associated orders:     1. Conjunctivitis of both eyes, unspecified conjunctivitis type  gentamicin-prednisoLONE 0.3-1 % ophthalmic drops         Comments/MDM:              Differential diagnosis, natural history, supportive care, and indications for immediate follow-up discussed.    Advised the patient to follow-up with the primary care physician for recheck, reevaluation, and consideration of further management.    Please note that this dictation was created using voice recognition software. I have made a reasonable attempt to correct obvious errors, but I expect that there are errors of grammar and possibly content that I did not discover before finalizing the note.    This note was electronically signed by Faustino Paz M.D.  "

## 2024-04-10 ENCOUNTER — APPOINTMENT (RX ONLY)
Dept: URBAN - METROPOLITAN AREA CLINIC 31 | Facility: CLINIC | Age: 69
Setting detail: DERMATOLOGY
End: 2024-04-10

## 2024-04-10 DIAGNOSIS — L85.3 XEROSIS CUTIS: ICD-10-CM

## 2024-04-10 PROCEDURE — ? COUNSELING

## 2024-04-10 PROCEDURE — ? ADDITIONAL NOTES

## 2024-04-10 PROCEDURE — 99213 OFFICE O/P EST LOW 20 MIN: CPT

## 2024-04-10 PROCEDURE — ? PRESCRIPTION

## 2024-04-10 RX ORDER — TRIAMCINOLONE ACETONIDE 1 MG/G
CREAM TOPICAL BID
Qty: 80 | Refills: 6 | Status: ERX | COMMUNITY
Start: 2024-04-10

## 2024-04-10 RX ADMIN — TRIAMCINOLONE ACETONIDE: 1 CREAM TOPICAL at 00:00

## 2024-04-10 ASSESSMENT — LOCATION SIMPLE DESCRIPTION DERM: LOCATION SIMPLE: ABDOMEN

## 2024-04-10 ASSESSMENT — LOCATION ZONE DERM: LOCATION ZONE: TRUNK

## 2024-04-10 ASSESSMENT — LOCATION DETAILED DESCRIPTION DERM
LOCATION DETAILED: LEFT RIB CAGE
LOCATION DETAILED: PERIUMBILICAL SKIN
LOCATION DETAILED: RIGHT RIB CAGE

## 2024-04-10 NOTE — PROCEDURE: ADDITIONAL NOTES
Render Risk Assessment In Note?: no
Detail Level: Detailed
Additional Notes: may be allergic contact dermatitis, consider patch testing if persistent
Additional Notes: may have component of ACD from new bedding. May need patch testing

## 2024-09-24 NOTE — PROCEDURE: REASSURANCE
Pt ambulates to and from restroom with steady gait. UA collected and sent to lab.  
Hide Additional Notes?: No
Detail Level: Simple

## 2024-12-11 ENCOUNTER — APPOINTMENT (OUTPATIENT)
Dept: URBAN - METROPOLITAN AREA CLINIC 31 | Facility: CLINIC | Age: 69
Setting detail: DERMATOLOGY
End: 2024-12-11

## 2024-12-11 DIAGNOSIS — L82.1 OTHER SEBORRHEIC KERATOSIS: ICD-10-CM

## 2024-12-11 DIAGNOSIS — L57.0 ACTINIC KERATOSIS: ICD-10-CM

## 2024-12-11 PROCEDURE — 17003 DESTRUCT PREMALG LES 2-14: CPT

## 2024-12-11 PROCEDURE — 17000 DESTRUCT PREMALG LESION: CPT

## 2024-12-11 PROCEDURE — ? COUNSELING

## 2024-12-11 PROCEDURE — ? LIQUID NITROGEN

## 2024-12-11 PROCEDURE — 99212 OFFICE O/P EST SF 10 MIN: CPT | Mod: 25

## 2024-12-11 ASSESSMENT — LOCATION DETAILED DESCRIPTION DERM
LOCATION DETAILED: LEFT NASAL DORSUM
LOCATION DETAILED: LEFT NASAL ROOT
LOCATION DETAILED: NASAL DORSUM

## 2024-12-11 ASSESSMENT — LOCATION SIMPLE DESCRIPTION DERM: LOCATION SIMPLE: NOSE

## 2024-12-11 ASSESSMENT — LOCATION ZONE DERM: LOCATION ZONE: NOSE

## 2025-01-07 ENCOUNTER — APPOINTMENT (OUTPATIENT)
Dept: URBAN - METROPOLITAN AREA CLINIC 31 | Facility: CLINIC | Age: 70
Setting detail: DERMATOLOGY
End: 2025-01-07

## 2025-01-07 DIAGNOSIS — D18.0 HEMANGIOMA: ICD-10-CM

## 2025-01-07 DIAGNOSIS — L57.0 ACTINIC KERATOSIS: ICD-10-CM

## 2025-01-07 DIAGNOSIS — L82.1 OTHER SEBORRHEIC KERATOSIS: ICD-10-CM

## 2025-01-07 DIAGNOSIS — D22 MELANOCYTIC NEVI: ICD-10-CM

## 2025-01-07 DIAGNOSIS — Z87.2 PERSONAL HISTORY OF DISEASES OF THE SKIN AND SUBCUTANEOUS TISSUE: ICD-10-CM

## 2025-01-07 DIAGNOSIS — Z71.89 OTHER SPECIFIED COUNSELING: ICD-10-CM

## 2025-01-07 DIAGNOSIS — L81.4 OTHER MELANIN HYPERPIGMENTATION: ICD-10-CM

## 2025-01-07 PROBLEM — D22.5 MELANOCYTIC NEVI OF TRUNK: Status: ACTIVE | Noted: 2025-01-07

## 2025-01-07 PROBLEM — D18.01 HEMANGIOMA OF SKIN AND SUBCUTANEOUS TISSUE: Status: ACTIVE | Noted: 2025-01-07

## 2025-01-07 PROCEDURE — 17000 DESTRUCT PREMALG LESION: CPT

## 2025-01-07 PROCEDURE — ? COUNSELING

## 2025-01-07 PROCEDURE — ? LIQUID NITROGEN

## 2025-01-07 PROCEDURE — 99213 OFFICE O/P EST LOW 20 MIN: CPT | Mod: 25

## 2025-01-07 PROCEDURE — 17003 DESTRUCT PREMALG LES 2-14: CPT

## 2025-01-07 ASSESSMENT — LOCATION DETAILED DESCRIPTION DERM
LOCATION DETAILED: NASAL DORSUM
LOCATION DETAILED: NASAL ROOT
LOCATION DETAILED: RIGHT INFERIOR UPPER BACK
LOCATION DETAILED: RIGHT SUPERIOR UPPER BACK
LOCATION DETAILED: LEFT MEDIAL UPPER BACK
LOCATION DETAILED: UPPER STERNUM
LOCATION DETAILED: RIGHT LATERAL UPPER BACK

## 2025-01-07 ASSESSMENT — LOCATION ZONE DERM
LOCATION ZONE: NOSE
LOCATION ZONE: TRUNK

## 2025-01-07 ASSESSMENT — LOCATION SIMPLE DESCRIPTION DERM
LOCATION SIMPLE: LEFT UPPER BACK
LOCATION SIMPLE: NOSE
LOCATION SIMPLE: CHEST
LOCATION SIMPLE: RIGHT UPPER BACK
